# Patient Record
Sex: FEMALE | Race: WHITE | NOT HISPANIC OR LATINO | Employment: FULL TIME | URBAN - METROPOLITAN AREA
[De-identification: names, ages, dates, MRNs, and addresses within clinical notes are randomized per-mention and may not be internally consistent; named-entity substitution may affect disease eponyms.]

---

## 2023-04-24 ENCOUNTER — OFFICE VISIT (OUTPATIENT)
Dept: PRIMARY CARE | Facility: CLINIC | Age: 33
End: 2023-04-24
Payer: COMMERCIAL

## 2023-04-24 VITALS
HEART RATE: 85 BPM | RESPIRATION RATE: 18 BRPM | TEMPERATURE: 98.6 F | WEIGHT: 165 LBS | BODY MASS INDEX: 30.36 KG/M2 | HEIGHT: 62 IN | OXYGEN SATURATION: 100 % | DIASTOLIC BLOOD PRESSURE: 82 MMHG | SYSTOLIC BLOOD PRESSURE: 114 MMHG

## 2023-04-24 DIAGNOSIS — J02.9 SORE THROAT: ICD-10-CM

## 2023-04-24 DIAGNOSIS — J01.90 ACUTE NON-RECURRENT SINUSITIS, UNSPECIFIED LOCATION: Primary | ICD-10-CM

## 2023-04-24 LAB — POC RAPID STREP: NEGATIVE

## 2023-04-24 PROCEDURE — 87880 STREP A ASSAY W/OPTIC: CPT | Performed by: FAMILY MEDICINE

## 2023-04-24 PROCEDURE — 87651 STREP A DNA AMP PROBE: CPT

## 2023-04-24 PROCEDURE — 99213 OFFICE O/P EST LOW 20 MIN: CPT | Performed by: FAMILY MEDICINE

## 2023-04-24 PROCEDURE — 1036F TOBACCO NON-USER: CPT | Performed by: FAMILY MEDICINE

## 2023-04-24 RX ORDER — AMOXICILLIN AND CLAVULANATE POTASSIUM 875; 125 MG/1; MG/1
875 TABLET, FILM COATED ORAL 2 TIMES DAILY
Qty: 20 TABLET | Refills: 0 | Status: SHIPPED | OUTPATIENT
Start: 2023-04-24 | End: 2023-05-04

## 2023-04-24 RX ORDER — CETIRIZINE HYDROCHLORIDE 10 MG/1
TABLET ORAL
COMMUNITY
Start: 2022-06-13

## 2023-04-24 RX ORDER — LISDEXAMFETAMINE DIMESYLATE 30 MG/1
1 CAPSULE ORAL DAILY
COMMUNITY
Start: 2022-10-24 | End: 2023-11-22 | Stop reason: SDUPTHER

## 2023-04-24 RX ORDER — BUSPIRONE HYDROCHLORIDE 5 MG/1
1 TABLET ORAL 2 TIMES DAILY
COMMUNITY
Start: 2023-04-17 | End: 2023-11-22 | Stop reason: ALTCHOICE

## 2023-04-24 RX ORDER — FLUTICASONE PROPIONATE 50 MCG
1 SPRAY, SUSPENSION (ML) NASAL DAILY
Qty: 16 G | Refills: 5 | Status: SHIPPED | OUTPATIENT
Start: 2023-04-24 | End: 2024-04-10 | Stop reason: WASHOUT

## 2023-04-24 ASSESSMENT — ENCOUNTER SYMPTOMS
SINUS COMPLAINT: 1
SORE THROAT: 1

## 2023-04-24 NOTE — PROGRESS NOTES
"Subjective   Patient ID: Lori Fry is a 32 y.o. female who presents for Sinus Problem (Pt. In for sinus issues going on for 3 weeks.) and Sore Throat.    She was starting to feel better she had sent her baby to  and now the whole family has been sick. Her baby has a fever and an ear infection. She started with cold sympotms, was getting better and now having body aches, hot and cold, stuffy nose, headaches, drainage when she blows her nose it is pretty thick.  She is also coughing up phlegm.      Sinus Problem  This is a new problem. The current episode started 1 to 4 weeks ago. Associated symptoms include a sore throat.   Sore Throat          Review of Systems   HENT:  Positive for sore throat.        Objective   /82 (BP Location: Left arm, Patient Position: Sitting)   Pulse 85   Temp 37 °C (98.6 °F) (Temporal)   Resp 18   Ht 1.575 m (5' 2\")   Wt 74.8 kg (165 lb)   SpO2 100%   BMI 30.18 kg/m²     Physical Exam  Constitutional:       Appearance: Normal appearance.   HENT:      Head: Normocephalic.      Right Ear: Tympanic membrane normal.      Left Ear: Tympanic membrane normal.      Nose: Congestion and rhinorrhea present.      Mouth/Throat:      Mouth: Mucous membranes are moist.      Pharynx: Posterior oropharyngeal erythema present.   Eyes:      Pupils: Pupils are equal, round, and reactive to light.   Cardiovascular:      Rate and Rhythm: Normal rate and regular rhythm.      Pulses: Normal pulses.   Pulmonary:      Effort: Pulmonary effort is normal. No respiratory distress.   Musculoskeletal:      Cervical back: Normal range of motion.   Lymphadenopathy:      Cervical: Cervical adenopathy present.   Skin:     General: Skin is warm and dry.   Neurological:      Mental Status: She is alert.         Assessment/Plan   Diagnoses and all orders for this visit:  Acute non-recurrent sinusitis, unspecified location  -     amoxicillin-pot clavulanate (Augmentin) 875-125 mg tablet; Take 1 tablet " (875 mg) by mouth in the morning and 1 tablet (875 mg) before bedtime. Do all this for 10 days.  -     fluticasone (Flonase) 50 mcg/actuation nasal spray; Administer 1 spray into each nostril once daily. Shake gently. Before first use, prime pump. After use, clean tip and replace cap.  Sore throat  -     POCT Rapid Strep A manually resulted  -     Group A Streptococcus, PCR; Future

## 2023-04-24 NOTE — PATIENT INSTRUCTIONS
Today we treated you for a sinus infection.  I sent in an antibiotic. Advise a probiotic daily while on abx. Follow up if no improvement or if symptoms worsen.

## 2023-04-25 LAB — GROUP A STREP, PCR: NOT DETECTED

## 2023-04-28 RX ORDER — METHYLPREDNISOLONE 4 MG/1
TABLET ORAL
Qty: 21 TABLET | Refills: 0 | Status: SHIPPED | OUTPATIENT
Start: 2023-04-28 | End: 2023-05-05

## 2023-05-22 ENCOUNTER — APPOINTMENT (OUTPATIENT)
Dept: PRIMARY CARE | Facility: CLINIC | Age: 33
End: 2023-05-22
Payer: COMMERCIAL

## 2023-05-26 ENCOUNTER — OFFICE VISIT (OUTPATIENT)
Dept: PRIMARY CARE | Facility: CLINIC | Age: 33
End: 2023-05-26
Payer: COMMERCIAL

## 2023-05-26 VITALS
BODY MASS INDEX: 27.8 KG/M2 | DIASTOLIC BLOOD PRESSURE: 64 MMHG | RESPIRATION RATE: 18 BRPM | WEIGHT: 152 LBS | SYSTOLIC BLOOD PRESSURE: 102 MMHG | TEMPERATURE: 98 F | HEART RATE: 84 BPM | OXYGEN SATURATION: 98 %

## 2023-05-26 DIAGNOSIS — J18.9 RECURRENT PNEUMONIA: Primary | ICD-10-CM

## 2023-05-26 DIAGNOSIS — C83.30 DIFFUSE LARGE B-CELL LYMPHOMA, UNSPECIFIED BODY REGION (MULTI): ICD-10-CM

## 2023-05-26 PROCEDURE — 1036F TOBACCO NON-USER: CPT | Performed by: FAMILY MEDICINE

## 2023-05-26 PROCEDURE — 99214 OFFICE O/P EST MOD 30 MIN: CPT | Performed by: FAMILY MEDICINE

## 2023-05-26 PROCEDURE — 3008F BODY MASS INDEX DOCD: CPT | Performed by: FAMILY MEDICINE

## 2023-05-26 RX ORDER — LEVOFLOXACIN 750 MG/1
TABLET ORAL
COMMUNITY
Start: 2023-05-20 | End: 2024-01-03

## 2023-05-26 RX ORDER — PHENOL 1.4 %
AEROSOL, SPRAY (ML) MUCOUS MEMBRANE
COMMUNITY
Start: 2022-06-13 | End: 2024-04-10 | Stop reason: WASHOUT

## 2023-05-26 RX ORDER — METHYLPREDNISOLONE 4 MG/1
TABLET ORAL
Qty: 21 TABLET | Refills: 0 | Status: SHIPPED | OUTPATIENT
Start: 2023-05-26 | End: 2023-06-02

## 2023-05-26 RX ORDER — ALBUTEROL SULFATE 90 UG/1
AEROSOL, METERED RESPIRATORY (INHALATION)
COMMUNITY
Start: 2023-05-20 | End: 2023-05-26 | Stop reason: SDUPTHER

## 2023-05-26 RX ORDER — ALBUTEROL SULFATE 90 UG/1
2 AEROSOL, METERED RESPIRATORY (INHALATION) 4 TIMES DAILY
Qty: 18 G | Refills: 0 | Status: SHIPPED | OUTPATIENT
Start: 2023-05-26 | End: 2024-04-16 | Stop reason: WASHOUT

## 2023-05-26 ASSESSMENT — ENCOUNTER SYMPTOMS: SPUTUM PRODUCTION: 0

## 2023-05-26 NOTE — PROGRESS NOTES
Subjective   Patient ID: Lori Fry is a 32 y.o. female who presents for Pneumonia (Diagnosed in ER about 4 weeks ago. Still experiencing, slight SOB, fatigue ).    She was here 6 weeks ago because she had cold symptoms.  We did Augmentin and then steroids.  She was getting better.  The swelling in the throat went away and she increased her appetite again.  After a few days her cough was developing and she hadn't had a cough before.  That got bad quickly.  She passed out at home she thinks she was coughing so hard that she passed out.  She was dizzy and coughing.  They did an Xray and she got diagnosed with pneumonia.  Then she was put on doxycycline  x 7 days.  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  She thought it was the end of the pneumonia.  Then last Friday she started feeling so sick again - diarrhea, vomiting, lightheaded, dizzy, nauseous.  She went to [facility] this time.  They did another Xray. The pneumonia was there it was on the other side now.  They gave her albuterol and levaquin.  Today is the last day. She has no more fevers.  She is coughing still.  She uses the albuterol 3 times a day.      She has no history of asthma. She isn't a smoker.  She is nervous because she seems to keep getting better and then worse again    She has scar tissue on one side of her lung (left) from when she had non hodgkins lymphoma.    She has a 14 month old but not nursing    Pneumonia  There is no sputum production.        Review of Systems   Respiratory:  Negative for sputum production.        Objective   /64   Pulse 84   Temp 36.7 °C (98 °F)   Resp 18   Wt 68.9 kg (152 lb)   LMP 05/19/2023 (Approximate)   SpO2 98%   BMI 27.80 kg/m²     Physical Exam  Constitutional:       Appearance: Normal appearance.   HENT:      Head: Normocephalic and atraumatic.      Right Ear: Tympanic membrane normal.      Left Ear: Tympanic membrane normal.      Nose: Congestion and rhinorrhea present.       Mouth/Throat:      Mouth: Mucous membranes are moist.   Cardiovascular:      Rate and Rhythm: Normal rate and regular rhythm.      Pulses: Normal pulses.   Pulmonary:      Effort: Pulmonary effort is normal. No respiratory distress.      Breath sounds: Normal breath sounds. No stridor. No wheezing, rhonchi or rales.   Musculoskeletal:      Cervical back: Normal range of motion and neck supple.   Skin:     General: Skin is warm and dry.   Neurological:      Mental Status: She is alert.   Psychiatric:         Mood and Affect: Mood normal.         Assessment/Plan

## 2023-05-26 NOTE — PATIENT INSTRUCTIONS
Today we have addressed your recurrent pneumonia and cough    Due to your complex history I have advised that we follow up with a CT scan next week after you are finished with your abx. You should take a medrol pack as well to help with post infectious cough and use your albuterol every 4 hours while awake.    Follow up when results received.

## 2023-06-02 ENCOUNTER — TELEPHONE (OUTPATIENT)
Dept: PRIMARY CARE | Facility: CLINIC | Age: 33
End: 2023-06-02
Payer: COMMERCIAL

## 2023-06-02 DIAGNOSIS — H10.30 ACUTE CONJUNCTIVITIS, UNSPECIFIED ACUTE CONJUNCTIVITIS TYPE, UNSPECIFIED LATERALITY: Primary | ICD-10-CM

## 2023-06-02 RX ORDER — TOBRAMYCIN 3 MG/ML
1 SOLUTION/ DROPS OPHTHALMIC EVERY 4 HOURS
Qty: 4.2 ML | Refills: 0 | Status: SHIPPED | OUTPATIENT
Start: 2023-06-02 | End: 2023-06-16

## 2023-06-02 NOTE — TELEPHONE ENCOUNTER
Pt called and said she thinks she is getting pink eye and is wondering if she can get a prescription for that.  Please advise

## 2023-06-11 ENCOUNTER — TELEPHONE (OUTPATIENT)
Dept: PRIMARY CARE | Facility: CLINIC | Age: 33
End: 2023-06-11
Payer: COMMERCIAL

## 2023-06-11 DIAGNOSIS — C83.30 DIFFUSE LARGE B-CELL LYMPHOMA, UNSPECIFIED BODY REGION (MULTI): ICD-10-CM

## 2023-06-11 DIAGNOSIS — J18.9 RECURRENT PNEUMONIA: Primary | ICD-10-CM

## 2023-06-11 NOTE — RESULT ENCOUNTER NOTE
Your CT scan  show signs of infectious or inflammatory process, which may take several weeks to resolve along with a couple of lung nodules. I definitely want to repeat a CT scan in three months, but also are you currently following with oncology any longer or have you not followed anyone since you’ve been in remission? I would like to reach out to them if you are and also, I think we should have you see a pulmonologist because of the recurrent infections and the lung nodules that were seen.

## 2023-11-08 ENCOUNTER — PATIENT MESSAGE (OUTPATIENT)
Dept: PULMONOLOGY | Facility: CLINIC | Age: 33
End: 2023-11-08
Payer: COMMERCIAL

## 2023-11-10 NOTE — PATIENT COMMUNICATION
Images have been received. In patients chart.          Image request form has been completed and sent for images to be sent to the patients chart.      Frances Urena, APRN-CNP  You3 hours ago (10:26 AM)       I can see the read but I need the images pushed over and she needs a follow up appt. Thank you

## 2023-11-16 ENCOUNTER — TELEPHONE (OUTPATIENT)
Dept: PULMONOLOGY | Facility: CLINIC | Age: 33
End: 2023-11-16
Payer: COMMERCIAL

## 2023-11-16 NOTE — TELEPHONE ENCOUNTER
Called and spoke with patient.    She will call  and schedule CT Scan , once she gets that scheduled she will call to schedule a follow up with Frances Urena CNP.

## 2023-11-16 NOTE — TELEPHONE ENCOUNTER
Can you call Lori and have her repeat the CT Chest since the last one was in August. And have her follow up with me? Please and thanks

## 2023-11-16 NOTE — TELEPHONE ENCOUNTER
Regarding: New appointment and shared records  Contact: [phone]  ----- Message from Jennifer Mcconnell MA sent at 11/10/2023  1:54 PM EST -----       ----- Message from Lori Fry to DAT Mahoney-CNP sent at 11/8/2023  8:26 AM -----   Hi - you had me set to do a follow up ct scan after our initial appointment. By coincidence I got sick and ended up getting a ct scan through the [facility]. I tried to link these [employer] accounts. Is that something you are able to see and/or review? I wanted to verify if you had access to the follow up ct scan before coming back into discuss if need be or if you felt we didn’t need a follow up appointment. I’m still getting these respirate infections frequently. Thank you.

## 2023-11-21 DIAGNOSIS — F90.9 ADULT ADHD: ICD-10-CM

## 2023-11-21 DIAGNOSIS — F41.9 ANXIETY: ICD-10-CM

## 2023-11-22 PROBLEM — F41.9 ANXIETY: Status: ACTIVE | Noted: 2023-11-22

## 2023-11-22 PROBLEM — F90.9 ADULT ADHD: Status: ACTIVE | Noted: 2023-11-22

## 2023-11-22 RX ORDER — ESCITALOPRAM OXALATE 5 MG/1
5 TABLET ORAL DAILY
COMMUNITY
Start: 2023-08-09 | End: 2023-11-22 | Stop reason: SDUPTHER

## 2023-11-22 RX ORDER — ESCITALOPRAM OXALATE 10 MG/1
10 TABLET ORAL DAILY
Qty: 30 TABLET | Refills: 1 | Status: SHIPPED | OUTPATIENT
Start: 2023-11-22 | End: 2024-01-08 | Stop reason: SDUPTHER

## 2023-11-22 RX ORDER — LISDEXAMFETAMINE DIMESYLATE 30 MG/1
30 CAPSULE ORAL DAILY
Qty: 30 CAPSULE | Refills: 0 | Status: SHIPPED | OUTPATIENT
Start: 2023-12-07 | End: 2024-01-08 | Stop reason: SDUPTHER

## 2023-11-22 NOTE — PROGRESS NOTES
Refill for Lexapro and Vyvanse placed per patient request. Reviewed OARRS on 11/22/23. No discrepancies or concerns noted. Vyvanse 30mg daily last filled on 11/8/23.

## 2023-12-06 ENCOUNTER — ANCILLARY PROCEDURE (OUTPATIENT)
Dept: RADIOLOGY | Facility: CLINIC | Age: 33
End: 2023-12-06
Payer: COMMERCIAL

## 2023-12-06 DIAGNOSIS — J06.9 ACUTE UPPER RESPIRATORY INFECTION, UNSPECIFIED: ICD-10-CM

## 2023-12-06 DIAGNOSIS — R05.9 COUGH, UNSPECIFIED: ICD-10-CM

## 2023-12-06 PROCEDURE — 71250 CT THORAX DX C-: CPT | Performed by: RADIOLOGY

## 2023-12-06 PROCEDURE — 71250 CT THORAX DX C-: CPT

## 2023-12-08 ENCOUNTER — TELEMEDICINE (OUTPATIENT)
Dept: PULMONOLOGY | Facility: CLINIC | Age: 33
End: 2023-12-08
Payer: COMMERCIAL

## 2023-12-08 DIAGNOSIS — J98.8 RECURRENT RESPIRATORY INFECTION: Primary | ICD-10-CM

## 2023-12-08 DIAGNOSIS — R91.8 LUNG NODULES: ICD-10-CM

## 2023-12-08 PROCEDURE — 99214 OFFICE O/P EST MOD 30 MIN: CPT

## 2023-12-08 RX ORDER — LEVOFLOXACIN 500 MG/1
500 TABLET, FILM COATED ORAL DAILY
Qty: 7 TABLET | Refills: 0 | Status: SHIPPED | OUTPATIENT
Start: 2023-12-08 | End: 2023-12-15

## 2023-12-08 NOTE — H&P (VIEW-ONLY)
l Patient: Lori Fry    [id number]  : 1990 -- AGE 33 y.o.    Provider: Frances DAUGHERTY- [facility]     Location [facility]   Service Date: 2023         Department of Medicine  Division of Pulmonary, Critical Care, and Sleep Medicine         [facility]  Follow Up Visit Note      Virtual or Telephone Consent  An interactive audio and video telecommunication system which permits real time communications between the patient (at the originating site) and provider (at the distant site) was utilized to provide this telehealth service.   Verbal consent was requested and obtained from Lori Fry on this date, 23 for a telehealth visit.       HISTORY OF PRESENT ILLNESS     HISTORY OF PRESENT ILLNESS   Lori Fry is a 33 y.o. female with a history of Large B Cell lymphoma who is a never smoker, who presents to a [facility] for a follow up evaluation for chronic cough and recurrent respiratory infections.     DATE OF LAST VISIT: 6/15/23    Current History    On today's visit, the patient reports keep she continues to have a productive cough with neon green to dark brown sputum.  Did not obtain sputum samples as discussed at last visit.  Since last visit she had pneumonia in August, was treated with antibiotics. She denies fevers, chills, chest pain and shortness of breath.      6/15/23: Mrs. Fry has a history of B cell lymphoma in . She completed chemotherapy in 2017. She continues to follow with oncology once a year. She reports going to the ED 23 for cough and SOB, she was given duo nebs and IVF for tachycardia. She reports having pneumonia over a month ago, she was given 3 courses of antibiotics and steroids until she finally improved. She reports feeling like she is starting to get another URI. She reports being sick on and off, thinks its from having a 14 month child in day care. She coughs up neon  green to brownish mucus, reports bloody noses and thinks that is why her mucus is sometimes brownish color. She was given an albuterol inhaler when she had pneumonia, she was using it frequently but now she is only needing to use it every other day. Denies wheezing, MOYA, chest pain, fevers, chills. She has allergy symptoms and takes cetirizine and Flonase.     Previous pulmonary history: She had pneumonia in May 2023. Pneumonia 5 years ago.      Inhalers/nebulized medications: albuterol     Hospitalization History: She has not been hospitalized over the last year for breathing related problem.     Sleep history: Denies snoring, apnea, feeling tired during the day or taking naps during the day.        REVIEW OF SYSTEMS     REVIEW OF SYSTEMS  Review of Systems    Constitutional: No fever, no chills, no night sweats.    Eyes: No double vision, no floaters, no dry eyes.   ENT: See HPI.   Neck: No neck stiffness.  Cardiovascular: No sharp chest pain, no heart racing, no leg swelling.  Respiratory: as noted in HPI. .   Integumentary: No rashes or sores.  Neurological: No dizziness, no headaches. Sleeping well.  Psychiatric: No mood changes.       ALLERGIES AND MEDICATIONS     ALLERGIES  No Known Allergies    MEDICATIONS  Current Outpatient Medications   Medication Sig Dispense Refill    albuterol 90 mcg/actuation inhaler Inhale 2 puffs 4 times a day. 18 g 0    cetirizine (ZyrTEC) 10 mg tablet Take by mouth.      escitalopram (Lexapro) 10 mg tablet Take 1 tablet (10 mg) by mouth once daily. 30 tablet 1    fluticasone (Flonase) 50 mcg/actuation nasal spray Administer 1 spray into each nostril once daily. Shake gently. Before first use, prime pump. After use, clean tip and replace cap. 16 g 5    levoFLOXacin (Levaquin) 750 mg tablet TAKE 1 TABLET BY MOUTH EVERY DAY FOR 7 DAYS      multivitamin-min-iron-FA-vit K (Adults Multivitamin) 18 mg iron-400 mcg-25 mcg tablet Take by mouth.      Vyvanse 30 mg capsule Take 1 capsule (30  mg) by mouth once daily. Do not start before December 7, 2023. 30 capsule 0     No current facility-administered medications for this visit.         PAST HISTORY     PAST MEDICAL HISTORY  Large B Cell lymphoma      PAST SURGICAL HISTORY  Past Surgical History:   Procedure Laterality Date    LYMPH NODE BIOPSY      chest - diagnosed with BCell NHL    WISDOM TOOTH EXTRACTION         IMMUNIZATION HISTORY  Immunization History   Administered Date(s) Administered    Pfizer Purple Cap SARS-CoV-2 03/11/2021, 04/01/2021, 12/16/2021       SOCIAL HISTORY  Tobacco Smoking: never  Illicit drugs: none  Alcohol consumption: none  Pets: 2 dogs    OCCUPATIONAL/ENVIRONMENTAL HISTORY  Occupation: Currently works as .  No have known exposure to asbestos, silica, beryllium or inhaled metals.    FAMILY HISTORY  No have a family history of pulmonary disease.  No have a family history of cancer.      PHYSICAL EXAM     VITAL SIGNS: There were no vitals taken for this visit.     PREVIOUS WEIGHTS:  Wt Readings from Last 3 Encounters:   06/15/23 72.1 kg (159 lb)   05/26/23 68.9 kg (152 lb)   04/24/23 74.8 kg (165 lb)       Physical Exam    Virtual video visit    RESULTS/DATA     Pulmonary Function Test Results     12/2017: FEV1/FVC 0.90/ FEV1 2.24 (70%)/ FVC 2.50 (66%)     Chest Radiograph     XR chest 2 view 05/20/2023    Narrative  Interpreted By:  DAVID ORO MD  STUDY:  Chest Radiographs; 05/20/2023 10:05 AM    INDICATION:  Cough.  Recent pneumonia.    COMPARISON:  XR chest 05/06/2023    ACCESSION NUMBER(S):  [id number]    ORDERING CLINICIAN:  ROD MORIN DO    TECHNIQUE:  Frontal and lateral chest.    FINDINGS:    CARDIOMEDIASTINAL SILHOUETTE:  Cardiomediastinal silhouette is normal in size and configuration.    LUNGS:  Small patchy area of airspace consolidation in the right middle lobe.  Underlying emphysema.  Stable chronic changes in the left midlung zone    ABDOMEN:  No remarkable upper abdominal  findings.    BONES:  No acute osseous changes.    Impression  Small patchy area of airspace consolidation in the right middle lobe.  Correlate clinically for pneumonia.      Signed by Isra Zuluaga MD      Chest CT Scan     Narrative & Impression   Interpreted By:  Candido Rojas,   STUDY:  CT CHEST WO IV CONTRAST;  12/6/2023 9:44 am      INDICATION:  Signs/Symptoms: recurrent URI/ cough  R05.9: Cough J06.9: Recurrent  URI (upper respiratory infection). History of lymphoma.      COMPARISON:  06/09/2023 enhanced thoracic CT.      ACCESSION NUMBER(S):  [id number]      ORDERING CLINICIAN:  DELFINO MALDONADO      TECHNIQUE:  Helical data acquisition of the chest was obtained  without IV  contrast material.  Images were reformatted in axial, coronal, and  sagittal planes.      FINDINGS:  LUNGS AND AIRWAYS:  The trachea and central airways are patent. Lingular and minimal left  upper lobe and medial right middle lobe bronchiectasis and lingular  bronchial wall thickening are noted. Portions of the middle lobe  medial segment bronchi are opacified.      Many round and irregular posteroinferior right upper lobe nodules  that measure up to at least 7 mm have increased in number. A 12 x 6  mm irregular nodule, a few tiny nodules and mild scarring in the left  upper lobe seem stable. Greater probably fibrotic changes in the  lingula are similar to the prior CT. Mild bilateral apical scarring.  Stable peripheral anterolateral right upper lobe micronodule (image  86 series 205). Mild pulmonary hyperinflation without bullae or  blebs. The above findings could reflect MAC complex infection, but  are not specific and should be correlated clinically. No pleural  effusion. Inferolateral right middle lobe and minimal basal left  lower lobe ground-glass changes reported on the prior CT have  resolved.      MEDIASTINUM AND IRMA, LOWER NECK AND AXILLA:  Normal size homogeneous thyroid.      An 18 x 8 x 17 mm right paratracheal lymph  "node just above the aortic  arch level and other mediastinal nodes are increased but still within  normal limits in size. Mild subcarinal adenopathy measures up to 13  mm in short axis AP diameter.      Esophagus appears within normal limits as seen.      HEART AND VESSELS:  The thoracic aorta is normal in course and caliber without vascular  calcifications.      Main pulmonary artery and its branches are normal in caliber.      No coronary artery calcifications are seen. The study is not  optimized for evaluation of coronary arteries.      The cardiac chambers are not enlarged.      No pericardial effusion.      UPPER ABDOMEN:  The visualized subdiaphragmatic structures demonstrate no remarkable  findings.      CHEST WALL AND OSSEOUS STRUCTURES:  There are no suspicious osseous lesions. Sternomanubrial joint  ankylosis.      IMPRESSION:  1. Chronic bilateral pulmonary findings described above might reflect  MAC complex infection, but are not specific.  2. A small right middle lobe infiltrate and minimal left lower lobe  ground-glass changes present in June 2023 have resolved.  Subcentimeter posteroinferior right upper lobe nodular opacities have  increased.  3. Minimal mediastinal adenopathy.      MACRO:  None      Signed by: Candido Rojas 12/7/2023 10:42 AM  Dictation workstation:   [id number]        Echocardiogram     No results found for this or any previous visit from the past 365 days.       Labwork   Complete Blood Count  Lab Results   Component Value Date    WBC 19.6 (H) 05/06/2023    HGB 13.5 05/06/2023    HCT 39.4 05/06/2023    MCV 84 05/06/2023     05/06/2023       Peripheral Eosinophil Count/Percentage:   Eosinophils Absolute (x10E9/L)   Date Value   05/06/2023 0.01     Eosinophils % (%)   Date Value   05/06/2023 0.1       Serum Immunoglobulin E:    No results found for: \"IGE\"       ASSESSMENT/PLAN     Ms. Fry is a 33 y.o. female, who has a history of Large B Cell lymphoma who is a never " smoker, who presents to a [facility] for a follow up evaluation for chronic cough and recurrent respiratory infections.   Discussed with Dr. Sanket Ag. Wait for sputum sampes to result. May be need to perform a bronchoscopy.    Problem List and Orders  Diagnoses and all orders for this visit:  Recurrent respiratory infection  -     levoFLOXacin (Levaquin) 500 mg tablet; Take 1 tablet (500 mg) by mouth once daily for 7 days. Take 1 pill daily with food.  -     AFB Culture/Smear  -     Fungal Culture/Smear  -     Respiratory Culture/Smear    Assessment and Plan / Recommendations:  Problem List Items Addressed This Visit    None   Productive Cough/ Recurrent URI  - will get sputum samples  - continue albuterol HFA inhaler or albuterol nebulizer every 4-6 hours as needed  - START course of levofloxacin 500mg for 7 days    Lung Nodules/Respiratory Infection      - will wait for sputum samples to result before deciding to order a bronchoscopy        Will call patient with sputum results    If you have any questions please call the office [phone]    Thank you for visiting the Pulmonary clinic today!   Frances Urena CNP  [phone]

## 2023-12-08 NOTE — PROGRESS NOTES
l Patient: Lori Fry    [id number]  : 1990 -- AGE 33 y.o.    Provider: Frances DAUGHERTY- [facility]     Location [facility]   Service Date: 2023         Department of Medicine  Division of Pulmonary, Critical Care, and Sleep Medicine         [facility]  Follow Up Visit Note      Virtual or Telephone Consent  An interactive audio and video telecommunication system which permits real time communications between the patient (at the originating site) and provider (at the distant site) was utilized to provide this telehealth service.   Verbal consent was requested and obtained from Lori Fry on this date, 23 for a telehealth visit.       HISTORY OF PRESENT ILLNESS     HISTORY OF PRESENT ILLNESS   Lori Fry is a 33 y.o. female with a history of Large B Cell lymphoma who is a never smoker, who presents to a [facility] for a follow up evaluation for chronic cough and recurrent respiratory infections.     DATE OF LAST VISIT: 6/15/23    Current History    On today's visit, the patient reports keep she continues to have a productive cough with neon green to dark brown sputum.  Did not obtain sputum samples as discussed at last visit.  Since last visit she had pneumonia in August, was treated with antibiotics. She denies fevers, chills, chest pain and shortness of breath.      6/15/23: Mrs. Fry has a history of B cell lymphoma in . She completed chemotherapy in 2017. She continues to follow with oncology once a year. She reports going to the ED 23 for cough and SOB, she was given duo nebs and IVF for tachycardia. She reports having pneumonia over a month ago, she was given 3 courses of antibiotics and steroids until she finally improved. She reports feeling like she is starting to get another URI. She reports being sick on and off, thinks its from having a 14 month child in day care. She coughs up neon  green to brownish mucus, reports bloody noses and thinks that is why her mucus is sometimes brownish color. She was given an albuterol inhaler when she had pneumonia, she was using it frequently but now she is only needing to use it every other day. Denies wheezing, MOYA, chest pain, fevers, chills. She has allergy symptoms and takes cetirizine and Flonase.     Previous pulmonary history: She had pneumonia in May 2023. Pneumonia 5 years ago.      Inhalers/nebulized medications: albuterol     Hospitalization History: She has not been hospitalized over the last year for breathing related problem.     Sleep history: Denies snoring, apnea, feeling tired during the day or taking naps during the day.        REVIEW OF SYSTEMS     REVIEW OF SYSTEMS  Review of Systems    Constitutional: No fever, no chills, no night sweats.    Eyes: No double vision, no floaters, no dry eyes.   ENT: See HPI.   Neck: No neck stiffness.  Cardiovascular: No sharp chest pain, no heart racing, no leg swelling.  Respiratory: as noted in HPI. .   Integumentary: No rashes or sores.  Neurological: No dizziness, no headaches. Sleeping well.  Psychiatric: No mood changes.       ALLERGIES AND MEDICATIONS     ALLERGIES  No Known Allergies    MEDICATIONS  Current Outpatient Medications   Medication Sig Dispense Refill    albuterol 90 mcg/actuation inhaler Inhale 2 puffs 4 times a day. 18 g 0    cetirizine (ZyrTEC) 10 mg tablet Take by mouth.      escitalopram (Lexapro) 10 mg tablet Take 1 tablet (10 mg) by mouth once daily. 30 tablet 1    fluticasone (Flonase) 50 mcg/actuation nasal spray Administer 1 spray into each nostril once daily. Shake gently. Before first use, prime pump. After use, clean tip and replace cap. 16 g 5    levoFLOXacin (Levaquin) 750 mg tablet TAKE 1 TABLET BY MOUTH EVERY DAY FOR 7 DAYS      multivitamin-min-iron-FA-vit K (Adults Multivitamin) 18 mg iron-400 mcg-25 mcg tablet Take by mouth.      Vyvanse 30 mg capsule Take 1 capsule (30  mg) by mouth once daily. Do not start before December 7, 2023. 30 capsule 0     No current facility-administered medications for this visit.         PAST HISTORY     PAST MEDICAL HISTORY  Large B Cell lymphoma      PAST SURGICAL HISTORY  Past Surgical History:   Procedure Laterality Date    LYMPH NODE BIOPSY      chest - diagnosed with BCell NHL    WISDOM TOOTH EXTRACTION         IMMUNIZATION HISTORY  Immunization History   Administered Date(s) Administered    Pfizer Purple Cap SARS-CoV-2 03/11/2021, 04/01/2021, 12/16/2021       SOCIAL HISTORY  Tobacco Smoking: never  Illicit drugs: none  Alcohol consumption: none  Pets: 2 dogs    OCCUPATIONAL/ENVIRONMENTAL HISTORY  Occupation: Currently works as .  No have known exposure to asbestos, silica, beryllium or inhaled metals.    FAMILY HISTORY  No have a family history of pulmonary disease.  No have a family history of cancer.      PHYSICAL EXAM     VITAL SIGNS: There were no vitals taken for this visit.     PREVIOUS WEIGHTS:  Wt Readings from Last 3 Encounters:   06/15/23 72.1 kg (159 lb)   05/26/23 68.9 kg (152 lb)   04/24/23 74.8 kg (165 lb)       Physical Exam    Virtual video visit    RESULTS/DATA     Pulmonary Function Test Results     12/2017: FEV1/FVC 0.90/ FEV1 2.24 (70%)/ FVC 2.50 (66%)     Chest Radiograph     XR chest 2 view 05/20/2023    Narrative  Interpreted By:  DAVID ORO MD  STUDY:  Chest Radiographs; 05/20/2023 10:05 AM    INDICATION:  Cough.  Recent pneumonia.    COMPARISON:  XR chest 05/06/2023    ACCESSION NUMBER(S):  [id number]    ORDERING CLINICIAN:  ROD MORIN DO    TECHNIQUE:  Frontal and lateral chest.    FINDINGS:    CARDIOMEDIASTINAL SILHOUETTE:  Cardiomediastinal silhouette is normal in size and configuration.    LUNGS:  Small patchy area of airspace consolidation in the right middle lobe.  Underlying emphysema.  Stable chronic changes in the left midlung zone    ABDOMEN:  No remarkable upper abdominal  findings.    BONES:  No acute osseous changes.    Impression  Small patchy area of airspace consolidation in the right middle lobe.  Correlate clinically for pneumonia.      Signed by Isra Zuluaga MD      Chest CT Scan     Narrative & Impression   Interpreted By:  Candido Rojas,   STUDY:  CT CHEST WO IV CONTRAST;  12/6/2023 9:44 am      INDICATION:  Signs/Symptoms: recurrent URI/ cough  R05.9: Cough J06.9: Recurrent  URI (upper respiratory infection). History of lymphoma.      COMPARISON:  06/09/2023 enhanced thoracic CT.      ACCESSION NUMBER(S):  [id number]      ORDERING CLINICIAN:  DELFINO MALDONADO      TECHNIQUE:  Helical data acquisition of the chest was obtained  without IV  contrast material.  Images were reformatted in axial, coronal, and  sagittal planes.      FINDINGS:  LUNGS AND AIRWAYS:  The trachea and central airways are patent. Lingular and minimal left  upper lobe and medial right middle lobe bronchiectasis and lingular  bronchial wall thickening are noted. Portions of the middle lobe  medial segment bronchi are opacified.      Many round and irregular posteroinferior right upper lobe nodules  that measure up to at least 7 mm have increased in number. A 12 x 6  mm irregular nodule, a few tiny nodules and mild scarring in the left  upper lobe seem stable. Greater probably fibrotic changes in the  lingula are similar to the prior CT. Mild bilateral apical scarring.  Stable peripheral anterolateral right upper lobe micronodule (image  86 series 205). Mild pulmonary hyperinflation without bullae or  blebs. The above findings could reflect MAC complex infection, but  are not specific and should be correlated clinically. No pleural  effusion. Inferolateral right middle lobe and minimal basal left  lower lobe ground-glass changes reported on the prior CT have  resolved.      MEDIASTINUM AND IRMA, LOWER NECK AND AXILLA:  Normal size homogeneous thyroid.      An 18 x 8 x 17 mm right paratracheal lymph  "node just above the aortic  arch level and other mediastinal nodes are increased but still within  normal limits in size. Mild subcarinal adenopathy measures up to 13  mm in short axis AP diameter.      Esophagus appears within normal limits as seen.      HEART AND VESSELS:  The thoracic aorta is normal in course and caliber without vascular  calcifications.      Main pulmonary artery and its branches are normal in caliber.      No coronary artery calcifications are seen. The study is not  optimized for evaluation of coronary arteries.      The cardiac chambers are not enlarged.      No pericardial effusion.      UPPER ABDOMEN:  The visualized subdiaphragmatic structures demonstrate no remarkable  findings.      CHEST WALL AND OSSEOUS STRUCTURES:  There are no suspicious osseous lesions. Sternomanubrial joint  ankylosis.      IMPRESSION:  1. Chronic bilateral pulmonary findings described above might reflect  MAC complex infection, but are not specific.  2. A small right middle lobe infiltrate and minimal left lower lobe  ground-glass changes present in June 2023 have resolved.  Subcentimeter posteroinferior right upper lobe nodular opacities have  increased.  3. Minimal mediastinal adenopathy.      MACRO:  None      Signed by: Candido Rojas 12/7/2023 10:42 AM  Dictation workstation:   [id number]        Echocardiogram     No results found for this or any previous visit from the past 365 days.       Labwork   Complete Blood Count  Lab Results   Component Value Date    WBC 19.6 (H) 05/06/2023    HGB 13.5 05/06/2023    HCT 39.4 05/06/2023    MCV 84 05/06/2023     05/06/2023       Peripheral Eosinophil Count/Percentage:   Eosinophils Absolute (x10E9/L)   Date Value   05/06/2023 0.01     Eosinophils % (%)   Date Value   05/06/2023 0.1       Serum Immunoglobulin E:    No results found for: \"IGE\"       ASSESSMENT/PLAN     Ms. Fry is a 33 y.o. female, who has a history of Large B Cell lymphoma who is a never " smoker, who presents to a [facility] for a follow up evaluation for chronic cough and recurrent respiratory infections.   Discussed with Dr. Sanket Ag. Wait for sputum sampes to result. May be need to perform a bronchoscopy.    Problem List and Orders  Diagnoses and all orders for this visit:  Recurrent respiratory infection  -     levoFLOXacin (Levaquin) 500 mg tablet; Take 1 tablet (500 mg) by mouth once daily for 7 days. Take 1 pill daily with food.  -     AFB Culture/Smear  -     Fungal Culture/Smear  -     Respiratory Culture/Smear    Assessment and Plan / Recommendations:  Problem List Items Addressed This Visit    None   Productive Cough/ Recurrent URI  - will get sputum samples  - continue albuterol HFA inhaler or albuterol nebulizer every 4-6 hours as needed  - START course of levofloxacin 500mg for 7 days    Lung Nodules/Respiratory Infection      - will wait for sputum samples to result before deciding to order a bronchoscopy        Will call patient with sputum results    If you have any questions please call the office [phone]    Thank you for visiting the Pulmonary clinic today!   Frances Urena CNP  [phone]

## 2023-12-11 ENCOUNTER — TELEPHONE (OUTPATIENT)
Dept: PULMONOLOGY | Facility: CLINIC | Age: 33
End: 2023-12-11
Payer: COMMERCIAL

## 2023-12-15 ENCOUNTER — LAB (OUTPATIENT)
Dept: LAB | Facility: LAB | Age: 33
End: 2023-12-15
Payer: COMMERCIAL

## 2023-12-15 PROCEDURE — 87102 FUNGUS ISOLATION CULTURE: CPT

## 2023-12-15 PROCEDURE — 87070 CULTURE OTHR SPECIMN AEROBIC: CPT

## 2023-12-15 PROCEDURE — 87205 SMEAR GRAM STAIN: CPT

## 2023-12-15 PROCEDURE — 87206 SMEAR FLUORESCENT/ACID STAI: CPT

## 2023-12-15 PROCEDURE — 87116 MYCOBACTERIA CULTURE: CPT

## 2023-12-15 PROCEDURE — 87015 SPECIMEN INFECT AGNT CONCNTJ: CPT

## 2023-12-15 PROCEDURE — 87075 CULTR BACTERIA EXCEPT BLOOD: CPT

## 2023-12-17 LAB
BACTERIA SPEC RESP CULT: NORMAL
GRAM STN SPEC: NORMAL
GRAM STN SPEC: NORMAL

## 2023-12-19 ENCOUNTER — DOCUMENTATION (OUTPATIENT)
Dept: PULMONOLOGY | Facility: CLINIC | Age: 33
End: 2023-12-19
Payer: COMMERCIAL

## 2023-12-19 ENCOUNTER — TELEPHONE (OUTPATIENT)
Dept: PULMONOLOGY | Facility: CLINIC | Age: 33
End: 2023-12-19
Payer: COMMERCIAL

## 2023-12-19 DIAGNOSIS — J98.8 RECURRENT RESPIRATORY INFECTION: Primary | ICD-10-CM

## 2023-12-19 NOTE — PROGRESS NOTES
I spoke with  Ang about the discussion between myself and Dr. Sanket Ag. He would like to perform a bronchoscopy. She is in agreement with this plan. Will get this scheduled for 1/3/24.

## 2023-12-19 NOTE — TELEPHONE ENCOUNTER
----- Message from DAT Mahoney-CNP sent at 12/19/2023  2:39 PM EST -----  Regarding: Bronch  Kathryn, Can you please call Mrs. Fry and get a bronch scheduled for her for 1/3/24. Thank you

## 2024-01-02 LAB
FUNGUS SPEC CULT: NORMAL
FUNGUS SPEC FUNGUS STN: NORMAL

## 2024-01-03 ENCOUNTER — ANESTHESIA EVENT (OUTPATIENT)
Dept: GASTROENTEROLOGY | Facility: HOSPITAL | Age: 34
End: 2024-01-03
Payer: COMMERCIAL

## 2024-01-03 ENCOUNTER — HOSPITAL ENCOUNTER (OUTPATIENT)
Dept: GASTROENTEROLOGY | Facility: HOSPITAL | Age: 34
Setting detail: OUTPATIENT SURGERY
Discharge: HOME | End: 2024-01-03
Payer: COMMERCIAL

## 2024-01-03 ENCOUNTER — ANESTHESIA (OUTPATIENT)
Dept: GASTROENTEROLOGY | Facility: HOSPITAL | Age: 34
End: 2024-01-03
Payer: COMMERCIAL

## 2024-01-03 VITALS
DIASTOLIC BLOOD PRESSURE: 64 MMHG | RESPIRATION RATE: 20 BRPM | WEIGHT: 160 LBS | SYSTOLIC BLOOD PRESSURE: 104 MMHG | HEART RATE: 80 BPM | BODY MASS INDEX: 29.44 KG/M2 | OXYGEN SATURATION: 98 % | HEIGHT: 62 IN | TEMPERATURE: 97.3 F

## 2024-01-03 DIAGNOSIS — J98.8 RECURRENT RESPIRATORY INFECTION: ICD-10-CM

## 2024-01-03 DIAGNOSIS — J47.0 BRONCHIECTASIS WITH ACUTE LOWER RESPIRATORY INFECTION (MULTI): Primary | ICD-10-CM

## 2024-01-03 LAB
BASOPHILS NFR FLD MANUAL: 0 %
BLASTS NFR FLD MANUAL: 0 %
CLARITY FLD: ABNORMAL
COLOR FLD: COLORLESS
EOSINOPHIL NFR FLD MANUAL: 6 %
HCG UR QL IA.RAPID: NEGATIVE
IMMATURE GRANULOCYTES IN FLUID: 0 %
LYMPHOCYTES NFR FLD MANUAL: 6 %
MONOS+MACROS NFR FLD MANUAL: 9 %
NEUTROPHILS NFR FLD MANUAL: 79 %
OTHER CELLS NFR FLD MANUAL: 0 %
PLASMA CELLS NFR FLD MANUAL: 0 %
RBC # FLD AUTO: 3000 /UL
TOTAL CELLS COUNTED FLD: 100
WBC # FLD AUTO: 6107 /UL

## 2024-01-03 PROCEDURE — 2500000004 HC RX 250 GENERAL PHARMACY W/ HCPCS (ALT 636 FOR OP/ED): Performed by: ANESTHESIOLOGIST ASSISTANT

## 2024-01-03 PROCEDURE — 89051 BODY FLUID CELL COUNT: CPT | Performed by: INTERNAL MEDICINE

## 2024-01-03 PROCEDURE — 88112 CYTOPATH CELL ENHANCE TECH: CPT | Performed by: PATHOLOGY

## 2024-01-03 PROCEDURE — 88312 SPECIAL STAINS GROUP 1: CPT | Mod: TC,91 | Performed by: INTERNAL MEDICINE

## 2024-01-03 PROCEDURE — 87305 ASPERGILLUS AG IA: CPT | Performed by: INTERNAL MEDICINE

## 2024-01-03 PROCEDURE — 87015 SPECIMEN INFECT AGNT CONCNTJ: CPT | Mod: STJLAB | Performed by: INTERNAL MEDICINE

## 2024-01-03 PROCEDURE — 87581 M.PNEUMON DNA AMP PROBE: CPT | Performed by: INTERNAL MEDICINE

## 2024-01-03 PROCEDURE — 7100000001 HC RECOVERY ROOM TIME - INITIAL BASE CHARGE

## 2024-01-03 PROCEDURE — 3700000002 HC GENERAL ANESTHESIA TIME - EACH INCREMENTAL 1 MINUTE

## 2024-01-03 PROCEDURE — 31624 DX BRONCHOSCOPE/LAVAGE: CPT | Performed by: INTERNAL MEDICINE

## 2024-01-03 PROCEDURE — 2500000005 HC RX 250 GENERAL PHARMACY W/O HCPCS: Performed by: ANESTHESIOLOGIST ASSISTANT

## 2024-01-03 PROCEDURE — A31624 PR BRONCHOSCOPY,DIAGNOSTIC W LAVAGE: Performed by: ANESTHESIOLOGY

## 2024-01-03 PROCEDURE — 7100000010 HC PHASE TWO TIME - EACH INCREMENTAL 1 MINUTE

## 2024-01-03 PROCEDURE — 81025 URINE PREGNANCY TEST: CPT | Performed by: INTERNAL MEDICINE

## 2024-01-03 PROCEDURE — 7100000009 HC PHASE TWO TIME - INITIAL BASE CHARGE

## 2024-01-03 PROCEDURE — 87799 DETECT AGENT NOS DNA QUANT: CPT | Performed by: INTERNAL MEDICINE

## 2024-01-03 PROCEDURE — 88312 SPECIAL STAINS GROUP 1: CPT | Performed by: PATHOLOGY

## 2024-01-03 PROCEDURE — 87799 DETECT AGENT NOS DNA QUANT: CPT | Mod: 91 | Performed by: INTERNAL MEDICINE

## 2024-01-03 PROCEDURE — 87632 RESP VIRUS 6-11 TARGETS: CPT | Performed by: INTERNAL MEDICINE

## 2024-01-03 PROCEDURE — 87070 CULTURE OTHR SPECIMN AEROBIC: CPT | Mod: 91 | Performed by: INTERNAL MEDICINE

## 2024-01-03 PROCEDURE — 87801 DETECT AGNT MULT DNA AMPLI: CPT | Performed by: INTERNAL MEDICINE

## 2024-01-03 PROCEDURE — A31624 PR BRONCHOSCOPY,DIAGNOSTIC W LAVAGE: Performed by: ANESTHESIOLOGIST ASSISTANT

## 2024-01-03 PROCEDURE — 7100000002 HC RECOVERY ROOM TIME - EACH INCREMENTAL 1 MINUTE

## 2024-01-03 PROCEDURE — 88104 CYTOPATH FL NONGYN SMEARS: CPT | Performed by: INTERNAL MEDICINE

## 2024-01-03 PROCEDURE — 3700000001 HC GENERAL ANESTHESIA TIME - INITIAL BASE CHARGE

## 2024-01-03 PROCEDURE — 87102 FUNGUS ISOLATION CULTURE: CPT | Mod: STJLAB | Performed by: INTERNAL MEDICINE

## 2024-01-03 PROCEDURE — 87533 HHV-6 DNA QUANT: CPT | Performed by: INTERNAL MEDICINE

## 2024-01-03 RX ORDER — ACETAMINOPHEN 325 MG/1
975 TABLET ORAL ONCE
Status: DISCONTINUED | OUTPATIENT
Start: 2024-01-03 | End: 2024-01-04 | Stop reason: HOSPADM

## 2024-01-03 RX ORDER — MIDAZOLAM HYDROCHLORIDE 1 MG/ML
INJECTION, SOLUTION INTRAMUSCULAR; INTRAVENOUS AS NEEDED
Status: DISCONTINUED | OUTPATIENT
Start: 2024-01-03 | End: 2024-01-03

## 2024-01-03 RX ORDER — HYDROCODONE BITARTRATE AND ACETAMINOPHEN 5; 325 MG/1; MG/1
1 TABLET ORAL EVERY 4 HOURS PRN
Status: DISCONTINUED | OUTPATIENT
Start: 2024-01-03 | End: 2024-01-04 | Stop reason: HOSPADM

## 2024-01-03 RX ORDER — SUCCINYLCHOLINE CHLORIDE 100 MG/5ML
SYRINGE (ML) INTRAVENOUS AS NEEDED
Status: DISCONTINUED | OUTPATIENT
Start: 2024-01-03 | End: 2024-01-03

## 2024-01-03 RX ORDER — SODIUM CHLORIDE, SODIUM LACTATE, POTASSIUM CHLORIDE, CALCIUM CHLORIDE 600; 310; 30; 20 MG/100ML; MG/100ML; MG/100ML; MG/100ML
100 INJECTION, SOLUTION INTRAVENOUS CONTINUOUS
Status: DISCONTINUED | OUTPATIENT
Start: 2024-01-03 | End: 2024-01-04 | Stop reason: HOSPADM

## 2024-01-03 RX ORDER — MIDAZOLAM HYDROCHLORIDE 1 MG/ML
1 INJECTION, SOLUTION INTRAMUSCULAR; INTRAVENOUS ONCE AS NEEDED
Status: DISCONTINUED | OUTPATIENT
Start: 2024-01-03 | End: 2024-01-04 | Stop reason: HOSPADM

## 2024-01-03 RX ORDER — SODIUM CHLORIDE, SODIUM LACTATE, POTASSIUM CHLORIDE, CALCIUM CHLORIDE 600; 310; 30; 20 MG/100ML; MG/100ML; MG/100ML; MG/100ML
INJECTION, SOLUTION INTRAVENOUS CONTINUOUS PRN
Status: DISCONTINUED | OUTPATIENT
Start: 2024-01-03 | End: 2024-01-03

## 2024-01-03 RX ORDER — LIDOCAINE HYDROCHLORIDE 20 MG/ML
INJECTION, SOLUTION INFILTRATION; PERINEURAL AS NEEDED
Status: DISCONTINUED | OUTPATIENT
Start: 2024-01-03 | End: 2024-01-03

## 2024-01-03 RX ORDER — OXYCODONE HYDROCHLORIDE 10 MG/1
10 TABLET ORAL EVERY 4 HOURS PRN
Status: DISCONTINUED | OUTPATIENT
Start: 2024-01-03 | End: 2024-01-04 | Stop reason: HOSPADM

## 2024-01-03 RX ORDER — ONDANSETRON HYDROCHLORIDE 2 MG/ML
4 INJECTION, SOLUTION INTRAVENOUS ONCE AS NEEDED
Status: DISCONTINUED | OUTPATIENT
Start: 2024-01-03 | End: 2024-01-04 | Stop reason: HOSPADM

## 2024-01-03 RX ORDER — HYDRALAZINE HYDROCHLORIDE 20 MG/ML
5 INJECTION INTRAMUSCULAR; INTRAVENOUS EVERY 30 MIN PRN
Status: DISCONTINUED | OUTPATIENT
Start: 2024-01-03 | End: 2024-01-04 | Stop reason: HOSPADM

## 2024-01-03 RX ORDER — FENTANYL CITRATE 50 UG/ML
INJECTION, SOLUTION INTRAMUSCULAR; INTRAVENOUS AS NEEDED
Status: DISCONTINUED | OUTPATIENT
Start: 2024-01-03 | End: 2024-01-03

## 2024-01-03 RX ORDER — ACETAMINOPHEN 325 MG/1
650 TABLET ORAL EVERY 4 HOURS PRN
Status: DISCONTINUED | OUTPATIENT
Start: 2024-01-03 | End: 2024-01-04 | Stop reason: HOSPADM

## 2024-01-03 RX ORDER — ALBUTEROL SULFATE 0.83 MG/ML
2.5 SOLUTION RESPIRATORY (INHALATION) ONCE AS NEEDED
Status: DISCONTINUED | OUTPATIENT
Start: 2024-01-03 | End: 2024-01-04 | Stop reason: HOSPADM

## 2024-01-03 RX ORDER — DEXAMETHASONE SODIUM PHOSPHATE 4 MG/ML
INJECTION, SOLUTION INTRA-ARTICULAR; INTRALESIONAL; INTRAMUSCULAR; INTRAVENOUS; SOFT TISSUE AS NEEDED
Status: DISCONTINUED | OUTPATIENT
Start: 2024-01-03 | End: 2024-01-03

## 2024-01-03 RX ORDER — ONDANSETRON HYDROCHLORIDE 2 MG/ML
INJECTION, SOLUTION INTRAVENOUS AS NEEDED
Status: DISCONTINUED | OUTPATIENT
Start: 2024-01-03 | End: 2024-01-03

## 2024-01-03 RX ORDER — PROPOFOL 10 MG/ML
INJECTION, EMULSION INTRAVENOUS CONTINUOUS PRN
Status: DISCONTINUED | OUTPATIENT
Start: 2024-01-03 | End: 2024-01-03

## 2024-01-03 RX ADMIN — SODIUM CHLORIDE, POTASSIUM CHLORIDE, SODIUM LACTATE AND CALCIUM CHLORIDE: 600; 310; 30; 20 INJECTION, SOLUTION INTRAVENOUS at 13:35

## 2024-01-03 RX ADMIN — MIDAZOLAM 2 MG: 1 INJECTION INTRAMUSCULAR; INTRAVENOUS at 13:35

## 2024-01-03 RX ADMIN — Medication 100 MG: at 13:38

## 2024-01-03 RX ADMIN — ONDANSETRON 4 MG: 2 INJECTION, SOLUTION INTRAMUSCULAR; INTRAVENOUS at 13:46

## 2024-01-03 RX ADMIN — PROPOFOL 300 MCG/KG/MIN: 10 INJECTION, EMULSION INTRAVENOUS at 13:36

## 2024-01-03 RX ADMIN — LIDOCAINE HYDROCHLORIDE 100 MG: 20 INJECTION, SOLUTION INFILTRATION; PERINEURAL at 13:38

## 2024-01-03 RX ADMIN — DEXAMETHASONE SODIUM PHOSPHATE 4 MG: 4 INJECTION INTRA-ARTICULAR; INTRALESIONAL; INTRAMUSCULAR; INTRAVENOUS; SOFT TISSUE at 13:46

## 2024-01-03 RX ADMIN — FENTANYL CITRATE 50 MCG: 50 INJECTION, SOLUTION INTRAMUSCULAR; INTRAVENOUS at 13:38

## 2024-01-03 RX ADMIN — FENTANYL CITRATE 50 MCG: 50 INJECTION, SOLUTION INTRAMUSCULAR; INTRAVENOUS at 13:44

## 2024-01-03 SDOH — HEALTH STABILITY: MENTAL HEALTH: CURRENT SMOKER: 0

## 2024-01-03 ASSESSMENT — PAIN SCALES - GENERAL
PAINLEVEL_OUTOF10: 0 - NO PAIN
PAINLEVEL_OUTOF10: 0 - NO PAIN
PAIN_LEVEL: 0
PAINLEVEL_OUTOF10: 0 - NO PAIN

## 2024-01-03 ASSESSMENT — COLUMBIA-SUICIDE SEVERITY RATING SCALE - C-SSRS
1. IN THE PAST MONTH, HAVE YOU WISHED YOU WERE DEAD OR WISHED YOU COULD GO TO SLEEP AND NOT WAKE UP?: NO
2. HAVE YOU ACTUALLY HAD ANY THOUGHTS OF KILLING YOURSELF?: NO
6. HAVE YOU EVER DONE ANYTHING, STARTED TO DO ANYTHING, OR PREPARED TO DO ANYTHING TO END YOUR LIFE?: NO

## 2024-01-03 ASSESSMENT — PAIN - FUNCTIONAL ASSESSMENT: PAIN_FUNCTIONAL_ASSESSMENT: 0-10

## 2024-01-03 NOTE — ANESTHESIA POSTPROCEDURE EVALUATION
Patient: Lori Fry    Procedure Summary       Date: 01/03/24 Room / Location: [facility]    Anesthesia Start: 1335 Anesthesia Stop: 1415    Procedure: BRONCHOSCOPY Diagnosis: Recurrent respiratory infection    Scheduled Providers: Maureen Ag MD Responsible Provider: La Nena Peoples MD    Anesthesia Type: general ASA Status: 2            Anesthesia Type: general    Vitals Value Taken Time   /78 01/03/24 1415   Temp 36 01/03/24 1415   Pulse 103 01/03/24 1415   Resp 16 01/03/24 1415   SpO2 98 01/03/24 1415       Anesthesia Post Evaluation    Patient location during evaluation: PACU  Patient participation: complete - patient cannot participate  Level of consciousness: awake and alert  Pain score: 0  Pain management: adequate  Multimodal analgesia pain management approach  Airway patency: patent  Two or more strategies used to mitigate risk of obstructive sleep apnea  Cardiovascular status: acceptable and stable  Respiratory status: acceptable and room air  Hydration status: acceptable  Postoperative Nausea and Vomiting: none        No notable events documented.

## 2024-01-03 NOTE — DISCHARGE INSTRUCTIONS
The anesthetics, sedatives or narcotics which were given to you today will be acting in your body for the next 24 hours, so you might feel a little sleepy or groggy. This feeling should slowly wear off.   Carefully read and follow the instructions below:   You received sedation today.   Do not drive or operate machinery or power tools of any kind.   No alcoholic beverages today, not even beer or wine.   No over the counter medications that contain alcohol or may cause drowsiness.   Do not make important decisions or sign legal documents.     Do not use Aspirin containing products or non-steroidal medications for the next 24 hours.  (Examples of these types of medications include: Advil, Aleve, Ecotrin, Ibuprofen, Motrin or Naprosyn.  This list is not all-inclusive.  Check with your physician or pharmacist before resuming these medications.)  Tylenol, cough medicine, cough drops or throat lozenges may be used when you are allowed to resume eating and drinking.     Call your physician if any of these symptoms occur:   High fever over 101 degrees or chills (a low grade fever is common for 24 hours)   Rash or hives   Persistent nausea or vomiting   Inability to urinate within 8 hours after the procedure  Go directly to the emergency room if you notice any of the following:   Shortness of breath   Chest pain  Coughing up large amounts of bright red blood greater than a teaspoonful of blood clots (about a teaspoonful for the next 24-48 hours is normal, especially if you had a biopsy)  Resume all normal medications unless directed otherwise by your doctor.    Follow up with your referring physician as previously scheduled.    If you experience any problems or have any questions following discharge, please call [phone].

## 2024-01-03 NOTE — ANESTHESIA PROCEDURE NOTES
Airway  Date/Time: 1/3/2024 1:40 PM  Urgency: elective    Airway not difficult    Staffing  Performed: ELIZABET   Authorized by: La Nena Peoples MD    Performed by: ELIZABET Smith  Patient location during procedure: OR    Indications and Patient Condition  Indications for airway management: anesthesia and airway protection  Spontaneous ventilation: present  Sedation level: deep  Preoxygenated: yes  Patient position: sniffing  MILS maintained throughout  Mask difficulty assessment: 1 - vent by mask    Final Airway Details  Final airway type: endotracheal airway      Successful airway: ETT  Cuffed: yes   Successful intubation technique: video laryngoscopy  Facilitating devices/methods: intubating stylet  Endotracheal tube insertion site: oral  Blade: Tamika  Blade size: #3  ETT size (mm): 8.0  Cormack-Lehane Classification: grade I - full view of glottis  Placement verified by: chest auscultation and capnometry   Inital cuff pressure (cm H2O): 12  Cuff volume (mL): 6  Measured from: lips  ETT to lips (cm): 21  Number of attempts at approach: 1  Number of other approaches attempted: 0

## 2024-01-03 NOTE — ANESTHESIA PREPROCEDURE EVALUATION
Patient: Lori Fry    Procedure Information       Date/Time: 01/03/24 1300    Scheduled providers: Maureen Ag MD    Procedure: BRONCHOSCOPY    Location: [facility]            Relevant Problems   Neuro/Psych   (+) Anxiety      Pulmonary   (+) Pneumonia      Hematology   (+) DLBCL (diffuse large B cell lymphoma) (CMS/HCC)       Clinical information reviewed:   Tobacco  Allergies  Meds  Problems  Med Hx  Surg Hx  OB Status    Fam Hx  Soc Hx        NPO Detail:  NPO/Void Status  Date of Last Liquid: 01/03/24  Time of Last Liquid: 0600  Date of Last Solid: 01/02/24  Time of Last Solid: 2100  Last Intake Type: Clear fluids  Time of Last Void: 1220         Physical Exam    Airway  Mallampati: I  TM distance: >3 FB  Neck ROM: full     Cardiovascular - normal exam  Rhythm: regular  Rate: normal     Dental - normal exam     Pulmonary - normal exam  Breath sounds clear to auscultation     Abdominal   (+) obese  Abdomen: soft  Bowel sounds: normal           Anesthesia Plan    ASA 2     general     The patient is not a current smoker.  Patient was previously instructed to abstain from smoking on day of procedure.  Patient did not smoke on day of procedure.  Education provided regarding risk of obstructive sleep apnea.  intravenous induction   Anesthetic plan and risks discussed with patient.  Use of blood products discussed with patient who.    Plan discussed with CAA.

## 2024-01-04 LAB — PATH REVIEW-CELL CT,FLUID: NORMAL

## 2024-01-05 LAB
ASPERGILLUS GALACTOMANNAN EIA (NON-BLOOD SPECIMEN): 0.11
ASPERGILLUS GALACTOMANNAN EIA (NON-BLOOD SPECIMEN): 0.14

## 2024-01-06 LAB
B-LACTAMASE ORGANISM ISLT: NEGATIVE
BACTERIA SPEC RESP CULT: ABNORMAL
BACTERIA SPEC RESP CULT: ABNORMAL
GRAM STN SPEC: ABNORMAL
GRAM STN SPEC: ABNORMAL

## 2024-01-07 LAB
B-LACTAMASE ORGANISM ISLT: NEGATIVE
BACTERIA SPEC RESP CULT: ABNORMAL
GRAM STN SPEC: ABNORMAL
GRAM STN SPEC: ABNORMAL

## 2024-01-08 DIAGNOSIS — F90.9 ADULT ADHD: ICD-10-CM

## 2024-01-08 DIAGNOSIS — F41.9 ANXIETY: ICD-10-CM

## 2024-01-08 LAB
LABORATORY COMMENT REPORT: NORMAL
LABORATORY COMMENT REPORT: NORMAL
PATH REPORT.FINAL DX SPEC: NORMAL
PATH REPORT.GROSS SPEC: NORMAL
PATH REPORT.RELEVANT HX SPEC: NORMAL
PATH REPORT.TOTAL CANCER: NORMAL

## 2024-01-08 RX ORDER — LISDEXAMFETAMINE DIMESYLATE 30 MG/1
30 CAPSULE ORAL DAILY
Qty: 30 CAPSULE | Refills: 0 | Status: SHIPPED | OUTPATIENT
Start: 2024-01-08 | End: 2024-01-25 | Stop reason: WASHOUT

## 2024-01-08 RX ORDER — ESCITALOPRAM OXALATE 10 MG/1
10 TABLET ORAL DAILY
Qty: 30 TABLET | Refills: 1 | Status: SHIPPED | OUTPATIENT
Start: 2024-01-08 | End: 2024-01-25 | Stop reason: SDUPTHER

## 2024-01-08 NOTE — PROGRESS NOTES
Received refill request for Vyvanse 30mg daily. Refill placed. Reviewed OARRS on 1/8/24. No discrepancies or concerns noted.

## 2024-01-10 LAB
ADENOVIRUS QPCR, VIRC: NOT DETECTED COPIES/ML
CHLAMYDIA.PNEUMONIAE PCR, VIRC: NOT DETECTED
CYTOMEGALOVIRUS DNA PCR, (NON-BLOOD SPECI: NOT DETECTED IU/ML
HUMAN HERPESVIRUS-6 DNA PCR, QUANTITATIVE (NON-BLOOD SPECIMEN): NOT DETECTED COPIES/ML
LEGIONELLA PNEUMO PCR, VIRC: NOT DETECTED
MYCOPLASMA.PNEUMONIAE PCR, VIRC: NOT DETECTED
PAN.LEGIONELLA PCR, VIRC: NOT DETECTED
PNEUMOCYSTIS PCR,QUANTITATIVE (NON-BLOOD SPECIMEN): NOT DETECTED COPIES/ML

## 2024-01-11 ENCOUNTER — TELEPHONE (OUTPATIENT)
Dept: PULMONOLOGY | Facility: CLINIC | Age: 34
End: 2024-01-11
Payer: COMMERCIAL

## 2024-01-12 NOTE — PROGRESS NOTES
Amnisure pending l Patient: Lori Fry    [id number]  : 1990 -- AGE 33 y.o.    Provider: Frances DAUGHERTY- [facility]     Location [facility]   Service Date: 2024         Department of Medicine  Division of Pulmonary, Critical Care, and Sleep Medicine         [facility]  Follow Up Visit Note      Virtual or Telephone Consent  An interactive audio and video telecommunication system which permits real time communications between the patient (at the originating site) and provider (at the distant site) was utilized to provide this telehealth service.   Verbal consent was requested and obtained from Lori Fry on this date, 24 for a telehealth visit.       HISTORY OF PRESENT ILLNESS     HISTORY OF PRESENT ILLNESS   Lori Fry is a 33 y.o. female with a history of Large B Cell lymphoma who is a never smoker, who presents to a [facility] for a follow up evaluation for chronic cough and recurrent respiratory infections.     DATE OF LAST VISIT: 6/15/23    Current History    On today's visit, the patient reports keep she continues to have a productive cough with neon green to dark brown sputum.  Did not obtain sputum samples as discussed at last visit.  Since last visit she had pneumonia in August, was treated with antibiotics. She denies fevers, chills, chest pain and shortness of breath.      6/15/23: Mrs. Fry has a history of B cell lymphoma in . She completed chemotherapy in 2017. She continues to follow with oncology once a year. She reports going to the ED 23 for cough and SOB, she was given duo nebs and IVF for tachycardia. She reports having pneumonia over a month ago, she was given 3 courses of antibiotics and steroids until she finally improved. She reports feeling like she is starting to get another URI. She reports being sick on and off, thinks its from having a 14 month child in day care. She coughs up neon  green to brownish mucus, reports bloody noses and thinks that is why her mucus is sometimes brownish color. She was given an albuterol inhaler when she had pneumonia, she was using it frequently but now she is only needing to use it every other day. Denies wheezing, MOYA, chest pain, fevers, chills. She has allergy symptoms and takes cetirizine and Flonase.     Previous pulmonary history: She had pneumonia in May 2023. Pneumonia 5 years ago.      Inhalers/nebulized medications: albuterol     Hospitalization History: She has not been hospitalized over the last year for breathing related problem.     Sleep history: Denies snoring, apnea, feeling tired during the day or taking naps during the day.        REVIEW OF SYSTEMS     REVIEW OF SYSTEMS  Review of Systems    Constitutional: No fever, no chills, no night sweats.    Eyes: No double vision, no floaters, no dry eyes.   ENT: See HPI.   Neck: No neck stiffness.  Cardiovascular: No sharp chest pain, no heart racing, no leg swelling.  Respiratory: as noted in HPI. .   Integumentary: No rashes or sores.  Neurological: No dizziness, no headaches. Sleeping well.  Psychiatric: No mood changes.       ALLERGIES AND MEDICATIONS     ALLERGIES  No Known Allergies    MEDICATIONS  Current Outpatient Medications   Medication Sig Dispense Refill    albuterol 90 mcg/actuation inhaler Inhale 2 puffs 4 times a day. 18 g 0    cetirizine (ZyrTEC) 10 mg tablet Take by mouth.      escitalopram (Lexapro) 10 mg tablet Take 1 tablet (10 mg) by mouth once daily. 30 tablet 1    fluticasone (Flonase) 50 mcg/actuation nasal spray Administer 1 spray into each nostril once daily. Shake gently. Before first use, prime pump. After use, clean tip and replace cap. 16 g 5    lisdexamfetamine (Vyvanse) 30 mg capsule Take 1 capsule (30 mg) by mouth once daily. 30 capsule 0    multivitamin-min-iron-FA-vit K (Adults Multivitamin) 18 mg iron-400 mcg-25 mcg tablet Take by mouth.       No current  facility-administered medications for this visit.         PAST HISTORY     PAST MEDICAL HISTORY  Large B Cell lymphoma      PAST SURGICAL HISTORY  Past Surgical History:   Procedure Laterality Date    IR LUMBAR PUNCTURE      LYMPH NODE BIOPSY      chest - diagnosed with BCell NHL    WISDOM TOOTH EXTRACTION         IMMUNIZATION HISTORY  Immunization History   Administered Date(s) Administered    Pfizer Purple Cap SARS-CoV-2 03/11/2021, 04/01/2021, 12/16/2021       SOCIAL HISTORY  Tobacco Smoking: never  Illicit drugs: none  Alcohol consumption: none  Pets: 2 dogs    OCCUPATIONAL/ENVIRONMENTAL HISTORY  Occupation: Currently works as .  No have known exposure to asbestos, silica, beryllium or inhaled metals.    FAMILY HISTORY  No have a family history of pulmonary disease.  No have a family history of cancer.      PHYSICAL EXAM     VITAL SIGNS: There were no vitals taken for this visit.     PREVIOUS WEIGHTS:  Wt Readings from Last 3 Encounters:   01/03/24 72.6 kg (160 lb)   06/15/23 72.1 kg (159 lb)   05/26/23 68.9 kg (152 lb)       Physical Exam    Virtual video visit    RESULTS/DATA     Pulmonary Function Test Results     12/2017: FEV1/FVC 0.90/ FEV1 2.24 (70%)/ FVC 2.50 (66%)     Chest Radiograph     XR chest 2 view 05/20/2023    Narrative  Interpreted By:  DAVID ORO MD  STUDY:  Chest Radiographs; 05/20/2023 10:05 AM    INDICATION:  Cough.  Recent pneumonia.    COMPARISON:  XR chest 05/06/2023    ACCESSION NUMBER(S):  [id number]    ORDERING CLINICIAN:  ROD MORIN DO    TECHNIQUE:  Frontal and lateral chest.    FINDINGS:    CARDIOMEDIASTINAL SILHOUETTE:  Cardiomediastinal silhouette is normal in size and configuration.    LUNGS:  Small patchy area of airspace consolidation in the right middle lobe.  Underlying emphysema.  Stable chronic changes in the left midlung zone    ABDOMEN:  No remarkable upper abdominal findings.    BONES:  No acute osseous changes.    Impression  Small patchy area of  airspace consolidation in the right middle lobe.  Correlate clinically for pneumonia.      Signed by Isra Zuluaga MD      Chest CT Scan     Narrative & Impression   Interpreted By:  Candido Rojas,   STUDY:  CT CHEST WO IV CONTRAST;  12/6/2023 9:44 am      INDICATION:  Signs/Symptoms: recurrent URI/ cough  R05.9: Cough J06.9: Recurrent  URI (upper respiratory infection). History of lymphoma.      COMPARISON:  06/09/2023 enhanced thoracic CT.      ACCESSION NUMBER(S):  [id number]      ORDERING CLINICIAN:  DELFINO MALDONADO      TECHNIQUE:  Helical data acquisition of the chest was obtained  without IV  contrast material.  Images were reformatted in axial, coronal, and  sagittal planes.      FINDINGS:  LUNGS AND AIRWAYS:  The trachea and central airways are patent. Lingular and minimal left  upper lobe and medial right middle lobe bronchiectasis and lingular  bronchial wall thickening are noted. Portions of the middle lobe  medial segment bronchi are opacified.      Many round and irregular posteroinferior right upper lobe nodules  that measure up to at least 7 mm have increased in number. A 12 x 6  mm irregular nodule, a few tiny nodules and mild scarring in the left  upper lobe seem stable. Greater probably fibrotic changes in the  lingula are similar to the prior CT. Mild bilateral apical scarring.  Stable peripheral anterolateral right upper lobe micronodule (image  86 series 205). Mild pulmonary hyperinflation without bullae or  blebs. The above findings could reflect MAC complex infection, but  are not specific and should be correlated clinically. No pleural  effusion. Inferolateral right middle lobe and minimal basal left  lower lobe ground-glass changes reported on the prior CT have  resolved.      MEDIASTINUM AND IRMA, LOWER NECK AND AXILLA:  Normal size homogeneous thyroid.      An 18 x 8 x 17 mm right paratracheal lymph node just above the aortic  arch level and other mediastinal nodes are increased  "but still within  normal limits in size. Mild subcarinal adenopathy measures up to 13  mm in short axis AP diameter.      Esophagus appears within normal limits as seen.      HEART AND VESSELS:  The thoracic aorta is normal in course and caliber without vascular  calcifications.      Main pulmonary artery and its branches are normal in caliber.      No coronary artery calcifications are seen. The study is not  optimized for evaluation of coronary arteries.      The cardiac chambers are not enlarged.      No pericardial effusion.      UPPER ABDOMEN:  The visualized subdiaphragmatic structures demonstrate no remarkable  findings.      CHEST WALL AND OSSEOUS STRUCTURES:  There are no suspicious osseous lesions. Sternomanubrial joint  ankylosis.      IMPRESSION:  1. Chronic bilateral pulmonary findings described above might reflect  MAC complex infection, but are not specific.  2. A small right middle lobe infiltrate and minimal left lower lobe  ground-glass changes present in June 2023 have resolved.  Subcentimeter posteroinferior right upper lobe nodular opacities have  increased.  3. Minimal mediastinal adenopathy.      MACRO:  None      Signed by: Candido Rojas 12/7/2023 10:42 AM  Dictation workstation:   [id number]        Echocardiogram     No Testing Done       Labwork   Complete Blood Count  Lab Results   Component Value Date    WBC 19.6 (H) 05/06/2023    HGB 13.5 05/06/2023    HCT 39.4 05/06/2023    MCV 84 05/06/2023     05/06/2023       Peripheral Eosinophil Count/Percentage:   Eosinophils Absolute (x10E9/L)   Date Value   05/06/2023 0.01     Eosinophils % (%)   Date Value   05/06/2023 0.1       Serum Immunoglobulin E:    No results found for: \"IGE\"       ASSESSMENT/PLAN     Ms. Fry is a 33 y.o. female, who has a history of Large B Cell lymphoma who is a never smoker, who presents to a [facility] for a follow up evaluation for chronic cough and recurrent " respiratory infections.   Discussed with Dr. Sanket Ag. Wait for sputum sampes to result. May be need to perform a bronchoscopy.    Problem List and Orders  Diagnoses and all orders for this visit:  Recurrent respiratory infection  -     levoFLOXacin (Levaquin) 500 mg tablet; Take 1 tablet (500 mg) by mouth once daily for 7 days. Take 1 pill daily with food.  -     AFB Culture/Smear  -     Fungal Culture/Smear  -     Respiratory Culture/Smear    Assessment and Plan / Recommendations:  Problem List Items Addressed This Visit    None   Productive Cough/ Recurrent URI  - will get sputum samples  - continue albuterol HFA inhaler or albuterol nebulizer every 4-6 hours as needed  - START course of levofloxacin 500mg for 7 days    Lung Nodules/Respiratory Infection      - will wait for sputum samples to result before deciding to order a bronchoscopy        Will call patient with sputum results    If you have any questions please call the office [phone]    Thank you for visiting the Pulmonary clinic today!   Frances Urena CNP  [phone]

## 2024-01-16 DIAGNOSIS — A31.9 MYCOBACTERIAL INFECTION: Primary | ICD-10-CM

## 2024-01-16 NOTE — PROGRESS NOTES
Spoke with Lori in regards to her positive AFB results. Referring her to Dr. Mauro VASQUEZ physician per Dr Sanket Ag.

## 2024-01-20 DIAGNOSIS — F41.9 ANXIETY: ICD-10-CM

## 2024-01-21 RX ORDER — ESCITALOPRAM OXALATE 10 MG/1
10 TABLET ORAL DAILY
Qty: 30 TABLET | Refills: 1 | OUTPATIENT
Start: 2024-01-21

## 2024-01-22 LAB
FUNGUS SPEC CULT: NORMAL
FUNGUS SPEC CULT: NORMAL
FUNGUS SPEC FUNGUS STN: NORMAL
FUNGUS SPEC FUNGUS STN: NORMAL

## 2024-01-25 ENCOUNTER — APPOINTMENT (OUTPATIENT)
Dept: PULMONOLOGY | Facility: CLINIC | Age: 34
End: 2024-01-25
Payer: COMMERCIAL

## 2024-01-25 ENCOUNTER — TELEMEDICINE (OUTPATIENT)
Dept: BEHAVIORAL HEALTH | Facility: CLINIC | Age: 34
End: 2024-01-25
Payer: COMMERCIAL

## 2024-01-25 DIAGNOSIS — Z00.00 HEALTHCARE MAINTENANCE: ICD-10-CM

## 2024-01-25 DIAGNOSIS — F41.9 ANXIETY: ICD-10-CM

## 2024-01-25 DIAGNOSIS — F90.9 ADULT ADHD: ICD-10-CM

## 2024-01-25 PROCEDURE — 99213 OFFICE O/P EST LOW 20 MIN: CPT

## 2024-01-25 RX ORDER — ESCITALOPRAM OXALATE 10 MG/1
10 TABLET ORAL DAILY
Qty: 90 TABLET | Refills: 1 | Status: SHIPPED | OUTPATIENT
Start: 2024-01-25 | End: 2024-04-10 | Stop reason: SDUPTHER

## 2024-01-25 RX ORDER — LISDEXAMFETAMINE DIMESYLATE 30 MG/1
30 CAPSULE ORAL EVERY MORNING
Qty: 30 CAPSULE | Refills: 0 | Status: SHIPPED | OUTPATIENT
Start: 2024-04-04 | End: 2024-05-13 | Stop reason: SDUPTHER

## 2024-01-25 RX ORDER — LISDEXAMFETAMINE DIMESYLATE 30 MG/1
30 CAPSULE ORAL EVERY MORNING
Qty: 30 CAPSULE | Refills: 0 | Status: SHIPPED | OUTPATIENT
Start: 2024-02-06 | End: 2024-04-10 | Stop reason: SDUPTHER

## 2024-01-25 RX ORDER — LISDEXAMFETAMINE DIMESYLATE 30 MG/1
30 CAPSULE ORAL EVERY MORNING
Qty: 30 CAPSULE | Refills: 0 | Status: SHIPPED | OUTPATIENT
Start: 2024-03-06 | End: 2024-04-10 | Stop reason: SDUPTHER

## 2024-01-25 NOTE — PROGRESS NOTES
"Subjective   Patient ID: Lori Fry is a 33 y.o. female who presents for ADHD, Anxiety, and Med Management Last visit with this writer on 9/28/23.     HPI  Patient arrived for virtual appointment. A/Ox3. When asked how she is doing patient reports \"All good\". \"Sick on and off\" since April with reoccurring resp. Infections and PNA. Following with Pulm and seeing ID today. Partner and child are doing well. Enjoyed recent date night. Positive response with Lexapro - no specific side effects reported. Work described as \"mostly okay\", take time off as needed for medication appointments. No recent visits with Deborah (established counselor) but starting couples counseling with partner in the near future.       Anxiety:   \"Definitely improved\"   Manageable most days   Restlessness/keyed up or on edge: denies   Irritability: denies   Sleep disturbance: endorses, improving   No panic attacks since last visit      - Depression -   Mood: \"fine\"   No anhedonia  Appetite: stable  + non-purposeful weight loss reported with recent illness   Sleep: adequate 7 hours/night   No hopelessness/guilt   No SI/HI     ADHD:   - functioning well at work and home   - Patient feels current dose of Vyvanse is adequate   - tolerating current regiment well   - No tics/tremors  - no CP/SOB  - No increasing anger/anxiety/irritability     Therapy: Deborah Montero (private practice) working to schedule FU to continue care.     Objective   Physical Exam  Constitutional:       Appearance: Normal appearance.   Neurological:      Mental Status: She is alert and oriented to person, place, and time.     General Appearance: Well groomed, appropriate eye contact  Attitude/Behavior: Cooperative  Motor: No psychomotor agitation or retardation, no tremor or other abnormal movements  Speech: Normal rate, volume, prosody  Gait/Station: WFL - Within functional limits  Mood: \"fine\"  Affect: Constricted  Thought Process: Linear, goal directed  Thought Associations: " No loosening of associations  Thought Content: Normal  Perception: No perceptual abnormalities noted  Sensorium: Alert and oriented to person, place, time and situation  Insight: Intact  Judgement: Intact  Cognition: Cognitively intact to conversational testing with respect to attention, orientation, fund of knowledge, recent and remote memory, and language      Lab Review:   not applicable    Assessment/Plan   Problem List Items Addressed This Visit             ICD-10-CM    Adult ADHD F90.9    Anxiety F41.9     Other Visit Diagnoses         Codes    Healthcare maintenance     Z00.00    Relevant Orders    Drug Screen, Urine With Reflex to Confirmation    Amphetamine Confirm, Urine        Lori Fry is a 31 year old female with a history of lymphoma currently residing in [place], [place] with her partner and young child. She is employed full-time as an . Initial visit with this writer on 7/20/22.   [employer] anxiety, ADHD      DSM-5   ADHD  anxiety disorder, unspecified      Current Medications   Lexapro 10mg daily   Vyvanse 30mg daily - last filled on 1/8/24  - Reviewed [place] on 1/25/24, no discrepancies or concerns.  - drug screen completed June 2022  - controlled substance agreement from June 2022   No side effects or ADRs reported     - Mood is stable. No SI/HI   - symptoms of anxiety improving  - repeat drug screen ordered per protocol   - c/w Vyvanse 30mg daily   - c/w Lexapro 10mg daily    - encouraged patient to communicate any questions, concerns or side effects if recognized.   - patient is aware this writer is leaving  March 2024, recommended to continue care with  Psychiatry. Has multiple medical providers at    - Lori is agreeable to plan detailed above    Start time 9:44am  End time 9:58am  Prep/charting time 5min  Total time 19min

## 2024-01-25 NOTE — PATIENT INSTRUCTIONS
Continue Lexapro 10mg daily   Continue Vyvanse 30mg daily   Please complete Drug Screen as ordered  Please call  Psychiatry in February if you have not been re-scheduled

## 2024-01-29 DIAGNOSIS — A31.9 MYCOBACTERIAL INFECTION, UNSPECIFIED: Primary | ICD-10-CM

## 2024-02-07 LAB
ACID FAST STN SPEC: NORMAL
MYCOBACTERIUM SPEC CULT: NORMAL

## 2024-02-13 ENCOUNTER — LAB (OUTPATIENT)
Dept: LAB | Facility: LAB | Age: 34
End: 2024-02-13
Payer: COMMERCIAL

## 2024-02-13 DIAGNOSIS — Z00.00 HEALTHCARE MAINTENANCE: ICD-10-CM

## 2024-02-13 DIAGNOSIS — C83.32 DIFFUSE LARGE B-CELL LYMPHOMA OF INTRATHORACIC LYMPH NODES (MULTI): ICD-10-CM

## 2024-02-13 LAB
ALBUMIN SERPL BCP-MCNC: 4 G/DL (ref 3.4–5)
ALP SERPL-CCNC: 116 U/L (ref 33–110)
ALT SERPL W P-5'-P-CCNC: 29 U/L (ref 7–45)
AMPHETAMINES UR QL SCN: ABNORMAL
ANION GAP SERPL CALC-SCNC: 9 MMOL/L (ref 10–20)
AST SERPL W P-5'-P-CCNC: 20 U/L (ref 9–39)
BARBITURATES UR QL SCN: ABNORMAL
BASOPHILS # BLD AUTO: 0.01 X10*3/UL (ref 0–0.1)
BASOPHILS NFR BLD AUTO: 0.2 %
BENZODIAZ UR QL SCN: ABNORMAL
BILIRUB SERPL-MCNC: 0.3 MG/DL (ref 0–1.2)
BUN SERPL-MCNC: 9 MG/DL (ref 6–23)
BZE UR QL SCN: ABNORMAL
CALCIUM SERPL-MCNC: 8.7 MG/DL (ref 8.6–10.3)
CANNABINOIDS UR QL SCN: ABNORMAL
CHLORIDE SERPL-SCNC: 105 MMOL/L (ref 98–107)
CO2 SERPL-SCNC: 29 MMOL/L (ref 21–32)
CREAT SERPL-MCNC: 0.68 MG/DL (ref 0.5–1.05)
EGFRCR SERPLBLD CKD-EPI 2021: >90 ML/MIN/1.73M*2
EOSINOPHIL # BLD AUTO: 0.09 X10*3/UL (ref 0–0.7)
EOSINOPHIL NFR BLD AUTO: 2 %
ERYTHROCYTE [DISTWIDTH] IN BLOOD BY AUTOMATED COUNT: 13.1 % (ref 11.5–14.5)
FENTANYL+NORFENTANYL UR QL SCN: ABNORMAL
GLUCOSE SERPL-MCNC: 84 MG/DL (ref 74–99)
HCT VFR BLD AUTO: 39.1 % (ref 36–46)
HGB BLD-MCNC: 12.7 G/DL (ref 12–16)
IMM GRANULOCYTES # BLD AUTO: 0.01 X10*3/UL (ref 0–0.7)
IMM GRANULOCYTES NFR BLD AUTO: 0.2 % (ref 0–0.9)
LDH SERPL L TO P-CCNC: 126 U/L (ref 84–246)
LYMPHOCYTES # BLD AUTO: 1.89 X10*3/UL (ref 1.2–4.8)
LYMPHOCYTES NFR BLD AUTO: 42.1 %
MCH RBC QN AUTO: 27.8 PG (ref 26–34)
MCHC RBC AUTO-ENTMCNC: 32.5 G/DL (ref 32–36)
MCV RBC AUTO: 86 FL (ref 80–100)
MONOCYTES # BLD AUTO: 0.3 X10*3/UL (ref 0.1–1)
MONOCYTES NFR BLD AUTO: 6.7 %
NEUTROPHILS # BLD AUTO: 2.19 X10*3/UL (ref 1.2–7.7)
NEUTROPHILS NFR BLD AUTO: 48.8 %
NRBC BLD-RTO: 0 /100 WBCS (ref 0–0)
OPIATES UR QL SCN: ABNORMAL
OXYCODONE+OXYMORPHONE UR QL SCN: ABNORMAL
PCP UR QL SCN: ABNORMAL
PLATELET # BLD AUTO: 175 X10*3/UL (ref 150–450)
POTASSIUM SERPL-SCNC: 3.9 MMOL/L (ref 3.5–5.3)
PROT SERPL-MCNC: 7 G/DL (ref 6.4–8.2)
RBC # BLD AUTO: 4.57 X10*6/UL (ref 4–5.2)
SODIUM SERPL-SCNC: 139 MMOL/L (ref 136–145)
WBC # BLD AUTO: 4.5 X10*3/UL (ref 4.4–11.3)

## 2024-02-13 PROCEDURE — 85025 COMPLETE CBC W/AUTO DIFF WBC: CPT

## 2024-02-13 PROCEDURE — 80324 DRUG SCREEN AMPHETAMINES 1/2: CPT

## 2024-02-13 PROCEDURE — 80307 DRUG TEST PRSMV CHEM ANLYZR: CPT

## 2024-02-13 PROCEDURE — 36415 COLL VENOUS BLD VENIPUNCTURE: CPT

## 2024-02-13 PROCEDURE — 83615 LACTATE (LD) (LDH) ENZYME: CPT

## 2024-02-13 PROCEDURE — 80053 COMPREHEN METABOLIC PANEL: CPT

## 2024-02-17 LAB
AMPHET UR-MCNC: 1423 NG/ML
MDA UR-MCNC: <200 NG/ML
MDEA UR-MCNC: <200 NG/ML
MDMA UR-MCNC: <200 NG/ML
METHAMPHET UR-MCNC: <200 NG/ML
PHENTERMINE UR CFM-MCNC: <200 NG/ML

## 2024-02-23 ENCOUNTER — OFFICE VISIT (OUTPATIENT)
Dept: HEMATOLOGY/ONCOLOGY | Facility: CLINIC | Age: 34
End: 2024-02-23
Payer: COMMERCIAL

## 2024-02-23 ENCOUNTER — LAB (OUTPATIENT)
Dept: LAB | Facility: CLINIC | Age: 34
End: 2024-02-23
Payer: COMMERCIAL

## 2024-02-23 VITALS
TEMPERATURE: 96.8 F | BODY MASS INDEX: 27.18 KG/M2 | DIASTOLIC BLOOD PRESSURE: 74 MMHG | RESPIRATION RATE: 16 BRPM | HEART RATE: 99 BPM | WEIGHT: 148.59 LBS | SYSTOLIC BLOOD PRESSURE: 111 MMHG | OXYGEN SATURATION: 96 %

## 2024-02-23 DIAGNOSIS — A31.0 PULMONARY MAI (MYCOBACTERIUM AVIUM-INTRACELLULARE) INFECTION (MULTI): ICD-10-CM

## 2024-02-23 DIAGNOSIS — A31.9 MYCOBACTERIAL INFECTION, UNSPECIFIED: ICD-10-CM

## 2024-02-23 DIAGNOSIS — F41.9 ANXIETY: ICD-10-CM

## 2024-02-23 DIAGNOSIS — C83.32 DIFFUSE LARGE B-CELL LYMPHOMA OF INTRATHORACIC LYMPH NODES (MULTI): Primary | ICD-10-CM

## 2024-02-23 DIAGNOSIS — F90.9 ADULT ADHD: ICD-10-CM

## 2024-02-23 LAB
ALBUMIN SERPL BCP-MCNC: 4.1 G/DL (ref 3.4–5)
ALP SERPL-CCNC: 84 U/L (ref 33–110)
ALT SERPL W P-5'-P-CCNC: 21 U/L (ref 7–45)
ANION GAP SERPL CALC-SCNC: 10 MMOL/L (ref 10–20)
AST SERPL W P-5'-P-CCNC: 17 U/L (ref 9–39)
BILIRUB SERPL-MCNC: 0.7 MG/DL (ref 0–1.2)
BUN SERPL-MCNC: 12 MG/DL (ref 6–23)
CALCIUM SERPL-MCNC: 9.5 MG/DL (ref 8.6–10.3)
CHLORIDE SERPL-SCNC: 103 MMOL/L (ref 98–107)
CO2 SERPL-SCNC: 29 MMOL/L (ref 21–32)
CREAT SERPL-MCNC: 0.7 MG/DL (ref 0.5–1.05)
EGFRCR SERPLBLD CKD-EPI 2021: >90 ML/MIN/1.73M*2
ERYTHROCYTE [DISTWIDTH] IN BLOOD BY AUTOMATED COUNT: 13.3 % (ref 11.5–14.5)
GLUCOSE SERPL-MCNC: 113 MG/DL (ref 74–99)
HCT VFR BLD AUTO: 39.4 % (ref 36–46)
HGB BLD-MCNC: 12.9 G/DL (ref 12–16)
MCH RBC QN AUTO: 27.8 PG (ref 26–34)
MCHC RBC AUTO-ENTMCNC: 32.7 G/DL (ref 32–36)
MCV RBC AUTO: 85 FL (ref 80–100)
PLATELET # BLD AUTO: 169 X10*3/UL (ref 150–450)
POTASSIUM SERPL-SCNC: 3.8 MMOL/L (ref 3.5–5.3)
PROT SERPL-MCNC: 7.4 G/DL (ref 6.4–8.2)
RBC # BLD AUTO: 4.64 X10*6/UL (ref 4–5.2)
SODIUM SERPL-SCNC: 138 MMOL/L (ref 136–145)
WBC # BLD AUTO: 5 X10*3/UL (ref 4.4–11.3)

## 2024-02-23 PROCEDURE — 80053 COMPREHEN METABOLIC PANEL: CPT

## 2024-02-23 PROCEDURE — 1036F TOBACCO NON-USER: CPT | Performed by: INTERNAL MEDICINE

## 2024-02-23 PROCEDURE — 3008F BODY MASS INDEX DOCD: CPT | Performed by: INTERNAL MEDICINE

## 2024-02-23 PROCEDURE — 99214 OFFICE O/P EST MOD 30 MIN: CPT | Performed by: INTERNAL MEDICINE

## 2024-02-23 PROCEDURE — 85027 COMPLETE CBC AUTOMATED: CPT

## 2024-02-23 PROCEDURE — 36415 COLL VENOUS BLD VENIPUNCTURE: CPT

## 2024-02-23 RX ORDER — AZITHROMYCIN 500 MG/1
TABLET, FILM COATED ORAL DAILY
COMMUNITY

## 2024-02-23 RX ORDER — ETHAMBUTOL HYDROCHLORIDE 400 MG/1
TABLET, FILM COATED ORAL DAILY
COMMUNITY

## 2024-02-23 RX ORDER — RIFAMPIN 300 MG/1
300 CAPSULE ORAL
COMMUNITY

## 2024-02-23 ASSESSMENT — PAIN SCALES - GENERAL: PAINLEVEL: 0-NO PAIN

## 2024-02-23 NOTE — PROGRESS NOTES
Patient ID: Lori Fry is a 33 y.o. female.  Referring Physician: No referring provider defined for this encounter.  Primary Care Provider: Susi Lantigua DO  Visit Type: Follow Up      Subjective    HPI I have an infection in my lung    Review of Systems   Constitutional: Negative.    HENT:  Negative.     Eyes: Negative.    Respiratory: Negative.     Cardiovascular: Negative.    Gastrointestinal: Negative.    Endocrine: Negative.    Genitourinary: Negative.     Musculoskeletal: Negative.    Skin: Negative.    Neurological: Negative.    Hematological: Negative.    Psychiatric/Behavioral: Negative.          Objective   BSA: 1.72 meters squared  /74 (BP Location: Right arm)   Pulse 99   Temp 36 °C (96.8 °F)   Resp 16   Wt 67.4 kg (148 lb 9.4 oz)   SpO2 96%   BMI 27.18 kg/m²      has a past medical history of Allergy status to unspecified drugs, medicaments and biological substances, Anxiety disorder, unspecified, DLBCL (diffuse large B cell lymphoma) (CMS/HCC) (05/26/2023), and Pneumonia (05/26/2023).   has a past surgical history that includes [place] tooth extraction; Lymph node biopsy; and IR lumbar puncture.  No family history on file.  Oncology History    No history exists.       Lori Fry  reports that she has never smoked. She has never used smokeless tobacco.  She  reports current alcohol use.  She  reports no history of drug use.    Physical Exam  Vitals reviewed.   Constitutional:       Appearance: Normal appearance.   HENT:      Head: Normocephalic.      Mouth/Throat:      Mouth: Mucous membranes are moist.   Eyes:      Extraocular Movements: Extraocular movements intact.      Pupils: Pupils are equal, round, and reactive to light.   Cardiovascular:      Rate and Rhythm: Regular rhythm.      Heart sounds: Normal heart sounds.   Pulmonary:      Breath sounds: Normal breath sounds.   Abdominal:      General: Bowel sounds are normal.      Palpations: Abdomen is soft.   Musculoskeletal:          General: Normal range of motion.      Cervical back: Normal range of motion and neck supple.   Skin:     General: Skin is warm.   Neurological:      General: No focal deficit present.      Mental Status: She is alert and oriented to person, place, and time.   Psychiatric:         Behavior: Behavior normal.         WBC   Date/Time Value Ref Range Status   02/13/2024 04:28 PM 4.5 4.4 - 11.3 x10*3/uL Final   05/06/2023 09:45 PM 19.6 (H) 4.4 - 11.3 x10E9/L Final   02/13/2023 02:20 PM 6.6 4.4 - 11.3 x10E9/L Final   03/11/2022 11:55 PM 9.6 4.4 - 11.3 x10E9/L Final     nRBC   Date Value Ref Range Status   02/13/2024 0.0 0.0 - 0.0 /100 WBCs Final   05/06/2023 0.0 0.0 - 0.0 /100 WBC Final   03/11/2022 0.0 0.0 - 0.0 /100 WBC Final   02/20/2020 0.0 0.0 - 0.0 /100 WBC Final     RBC   Date Value Ref Range Status   02/13/2024 4.57 4.00 - 5.20 x10*6/uL Final   05/06/2023 4.67 4.00 - 5.20 x10E12/L Final   02/13/2023 4.65 4.00 - 5.20 x10E12/L Final   03/11/2022 4.11 4.00 - 5.20 x10E12/L Final     Hemoglobin   Date Value Ref Range Status   02/13/2024 12.7 12.0 - 16.0 g/dL Final   05/06/2023 13.5 12.0 - 16.0 g/dL Final   02/13/2023 13.9 12.0 - 16.0 g/dL Final   03/11/2022 12.6 12.0 - 16.0 g/dL Final     Hematocrit   Date Value Ref Range Status   02/13/2024 39.1 36.0 - 46.0 % Final   05/06/2023 39.4 36.0 - 46.0 % Final   02/13/2023 41.3 36.0 - 46.0 % Final   03/11/2022 37.9 36.0 - 46.0 % Final     MCV   Date/Time Value Ref Range Status   02/13/2024 04:28 PM 86 80 - 100 fL Final   05/06/2023 09:45 PM 84 80 - 100 fL Final   02/13/2023 02:20 PM 89 80 - 100 fL Final   03/11/2022 11:55 PM 92 80 - 100 fL Final     MCH   Date/Time Value Ref Range Status   02/13/2024 04:28 PM 27.8 26.0 - 34.0 pg Final     MCHC   Date/Time Value Ref Range Status   02/13/2024 04:28 PM 32.5 32.0 - 36.0 g/dL Final   05/06/2023 09:45 PM 34.3 32.0 - 36.0 g/dL Final   02/13/2023 02:20 PM 33.7 32.0 - 36.0 g/dL Final   03/11/2022 11:55 PM 33.2 32.0 - 36.0 g/dL  "Final     RDW   Date/Time Value Ref Range Status   02/13/2024 04:28 PM 13.1 11.5 - 14.5 % Final   05/06/2023 09:45 PM 12.9 11.5 - 14.5 % Final   02/13/2023 02:20 PM 13.0 11.5 - 14.5 % Final   03/11/2022 11:55 PM 13.2 11.5 - 14.5 % Final     Platelets   Date/Time Value Ref Range Status   02/13/2024 04:28  150 - 450 x10*3/uL Final   05/06/2023 09:45  150 - 450 x10E9/L Final   02/13/2023 02:20  150 - 450 x10E9/L Final   03/11/2022 11:55  (L) 150 - 450 x10E9/L Final     No results found for: \"MPV\"  Neutrophils %   Date/Time Value Ref Range Status   02/13/2024 04:28 PM 48.8 40.0 - 80.0 % Final   05/06/2023 09:45 PM 89.3 40.0 - 80.0 % Final   02/13/2023 02:20 PM 60.8 40.0 - 80.0 % Final   02/04/2022 11:30 AM 68.1 40.0 - 80.0 % Final     Immature Granulocytes %, Automated   Date/Time Value Ref Range Status   02/13/2024 04:28 PM 0.2 0.0 - 0.9 % Final     Comment:     Immature Granulocyte Count (IG) includes promyelocytes, myelocytes and metamyelocytes but does not include bands. Percent differential counts (%) should be interpreted in the context of the absolute cell counts (cells/UL).   05/06/2023 09:45 PM 0.6 0.0 - 0.9 % Final     Comment:      Immature Granulocyte Count (IG) includes promyelocytes,    myelocytes and metamyelocytes but does not include bands.   Percent differential counts (%) should be interpreted in the   context of the absolute cell counts (cells/L).     02/13/2023 02:20 PM 0.2 0.0 - 0.9 % Final     Comment:      Immature Granulocyte Count (IG) includes promyelocytes,    myelocytes and metamyelocytes but does not include bands.   Percent differential counts (%) should be interpreted in the   context of the absolute cell counts (cells/L).     02/04/2022 11:30 AM 0.4 0.0 - 0.9 % Final     Comment:      Immature Granulocyte Count (IG) includes promyelocytes,    myelocytes and metamyelocytes but does not include bands.   Percent differential counts (%) should be interpreted in the   " context of the absolute cell counts (cells/L).       Lymphocytes %   Date/Time Value Ref Range Status   02/13/2024 04:28 PM 42.1 13.0 - 44.0 % Final   05/06/2023 09:45 PM 6.7 13.0 - 44.0 % Final   02/13/2023 02:20 PM 30.6 13.0 - 44.0 % Final   02/04/2022 11:30 AM 22.0 13.0 - 44.0 % Final     Monocytes %   Date/Time Value Ref Range Status   02/13/2024 04:28 PM 6.7 2.0 - 10.0 % Final   05/06/2023 09:45 PM 3.1 2.0 - 10.0 % Final   02/13/2023 02:20 PM 7.4 2.0 - 10.0 % Final   02/04/2022 11:30 AM 8.8 2.0 - 10.0 % Final     Eosinophils %   Date/Time Value Ref Range Status   02/13/2024 04:28 PM 2.0 0.0 - 6.0 % Final   05/06/2023 09:45 PM 0.1 0.0 - 6.0 % Final   02/13/2023 02:20 PM 0.8 0.0 - 6.0 % Final   02/04/2022 11:30 AM 0.6 0.0 - 6.0 % Final     Basophils %   Date/Time Value Ref Range Status   02/13/2024 04:28 PM 0.2 0.0 - 2.0 % Final   05/06/2023 09:45 PM 0.2 0.0 - 2.0 % Final   02/13/2023 02:20 PM 0.2 0.0 - 2.0 % Final   02/04/2022 11:30 AM 0.1 0.0 - 2.0 % Final     Neutrophils Absolute   Date/Time Value Ref Range Status   02/13/2024 04:28 PM 2.19 1.20 - 7.70 x10*3/uL Final     Comment:     Percent differential counts (%) should be interpreted in the context of the absolute cell counts (cells/uL).   05/06/2023 09:45 PM 17.54 (H) 1.20 - 7.70 x10E9/L Final   02/13/2023 02:20 PM 4.02 1.20 - 7.70 x10E9/L Final   02/04/2022 11:30 AM 5.59 1.20 - 7.70 x10E9/L Final     Immature Granulocytes Absolute, Automated   Date/Time Value Ref Range Status   02/13/2024 04:28 PM 0.01 0.00 - 0.70 x10*3/uL Final     Lymphocytes Absolute   Date/Time Value Ref Range Status   02/13/2024 04:28 PM 1.89 1.20 - 4.80 x10*3/uL Final   05/06/2023 09:45 PM 1.32 1.20 - 4.80 x10E9/L Final   02/13/2023 02:20 PM 2.02 1.20 - 4.80 x10E9/L Final   02/04/2022 11:30 AM 1.80 1.20 - 4.80 x10E9/L Final     Monocytes Absolute   Date/Time Value Ref Range Status   02/13/2024 04:28 PM 0.30 0.10 - 1.00 x10*3/uL Final   05/06/2023 09:45 PM 0.61 0.10 - 1.00 x10E9/L  "Final   02/13/2023 02:20 PM 0.49 0.10 - 1.00 x10E9/L Final   02/04/2022 11:30 AM 0.72 0.10 - 1.00 x10E9/L Final     Eosinophils Absolute   Date/Time Value Ref Range Status   02/13/2024 04:28 PM 0.09 0.00 - 0.70 x10*3/uL Final   05/06/2023 09:45 PM 0.01 0.00 - 0.70 x10E9/L Final   02/13/2023 02:20 PM 0.05 0.00 - 0.70 x10E9/L Final   02/04/2022 11:30 AM 0.05 0.00 - 0.70 x10E9/L Final     Basophils Absolute   Date/Time Value Ref Range Status   02/13/2024 04:28 PM 0.01 0.00 - 0.10 x10*3/uL Final   05/06/2023 09:45 PM 0.03 0.00 - 0.10 x10E9/L Final   02/13/2023 02:20 PM 0.01 0.00 - 0.10 x10E9/L Final   02/04/2022 11:30 AM 0.01 0.00 - 0.10 x10E9/L Final       No components found for: \"PT\"  No results found for: \"APTT\"  Medication Documentation Review Audit       Reviewed by Mayra Walton MA (Medical Assistant) on 02/23/24 at 1601      Medication Order Taking? Sig Documenting Provider Last Dose Status   albuterol 90 mcg/actuation inhaler [id number] No Inhale 2 puffs 4 times a day.   Patient not taking: Reported on 2/23/2024    Susi Lantigua, DO Not Taking Active   azithromycin (Zithromax) 500 mg tablet [id number] Yes Take by mouth once daily. Historical Provider, MD Taking Active   cetirizine (ZyrTEC) 10 mg tablet [id number] Yes Take by mouth. Historical Provider, MD Taking Active   escitalopram (Lexapro) 10 mg tablet [id number] Yes Take 1 tablet (10 mg) by mouth once daily. Collin Abraham, APRN-CNP Taking Active   ethambutol (Myambutol) 400 mg tablet [id number]  Take by mouth once daily. Historical Provider, MD  Active   fluticasone (Flonase) 50 mcg/actuation nasal spray [id number] No Administer 1 spray into each nostril once daily. Shake gently. Before first use, prime pump. After use, clean tip and replace cap.   Patient not taking: Reported on 2/23/2024    Susi Lantigua, DO Not Taking Active   lisdexamfetamine (Vyvanse) 30 mg capsule [id number] Yes Take 1 capsule (30 mg) by mouth once daily in the morning. Do not start " before February 6, 2024. Collin Abraham, APRN-CNP Taking Active   lisdexamfetamine (Vyvanse) 30 mg capsule [id number] No Take 1 capsule (30 mg) by mouth once daily in the morning. Do not start before March 6, 2024.   Patient not taking: Reported on 2/23/2024 Do not start before March 6, 2024.    Collin Abraham APRN-CNP Not Taking Active   lisdexamfetamine (Vyvanse) 30 mg capsule [id number] No Take 1 capsule (30 mg) by mouth once daily in the morning. Do not start before April 4, 2024.   Patient not taking: Reported on 2/23/2024 Do not start before April 4, 2024.    DAT Saunders-CNP Not Taking Active   multivitamin-min-iron-FA-vit K (Adults Multivitamin) 18 mg iron-400 mcg-25 mcg tablet [id number] Yes Take by mouth. Historical Provider, MD Taking Active   rifAMPin (Rifadin) 300 mg capsule [id number]  Take 1 capsule (300 mg) by mouth. Historical Provider, MD  Active                   Assessment/Plan    1) diffuse large B cell lymphoma  -diagnosed in 2/2016  -s/p RCHOP x 6  -here for interval followup  -recently recurrent URI/pneumonia was due to SANDRO  -labs done on 2/13/2024 included CBC COMP, LDH  -results reviewed--wbc 4.5 hgb 12.7 plt 175,000, creatinine 0.68, alk phos 116, AST 20, ALT 29,   -limited physical exam done today--no cervical, supraclavicular, axillary adenopathy  -will continue to return annually      2) pulmonary SANDRO  -has had recurrent bouts of pneumonia  -CT scan showed multiple lung nodules as well as bronchiectasis  -had bronchoscopy done by Dr Coles with 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

## 2024-03-04 PROBLEM — A31.0 PULMONARY MAI (MYCOBACTERIUM AVIUM-INTRACELLULARE) INFECTION (MULTI): Status: ACTIVE | Noted: 2024-03-04

## 2024-03-04 ASSESSMENT — ENCOUNTER SYMPTOMS
CONSTITUTIONAL NEGATIVE: 1
GASTROINTESTINAL NEGATIVE: 1
CARDIOVASCULAR NEGATIVE: 1
MUSCULOSKELETAL NEGATIVE: 1
RESPIRATORY NEGATIVE: 1
ENDOCRINE NEGATIVE: 1
PSYCHIATRIC NEGATIVE: 1
HEMATOLOGIC/LYMPHATIC NEGATIVE: 1
EYES NEGATIVE: 1
NEUROLOGICAL NEGATIVE: 1

## 2024-03-06 LAB
ACID FAST STN SPEC: ABNORMAL
MYCOBACTERIUM SPEC CULT: ABNORMAL

## 2024-03-07 LAB
ACID FAST STN SPEC: ABNORMAL
MYCOBACTERIUM SPEC CULT: ABNORMAL

## 2024-04-10 ENCOUNTER — OFFICE VISIT (OUTPATIENT)
Dept: BEHAVIORAL HEALTH | Facility: CLINIC | Age: 34
End: 2024-04-10
Payer: COMMERCIAL

## 2024-04-10 VITALS
HEART RATE: 76 BPM | WEIGHT: 143.3 LBS | BODY MASS INDEX: 26.37 KG/M2 | SYSTOLIC BLOOD PRESSURE: 98 MMHG | HEIGHT: 62 IN | DIASTOLIC BLOOD PRESSURE: 65 MMHG | RESPIRATION RATE: 18 BRPM

## 2024-04-10 DIAGNOSIS — F90.9 ADULT ADHD: ICD-10-CM

## 2024-04-10 DIAGNOSIS — F41.9 ANXIETY: ICD-10-CM

## 2024-04-10 PROCEDURE — 3008F BODY MASS INDEX DOCD: CPT

## 2024-04-10 PROCEDURE — 99215 OFFICE O/P EST HI 40 MIN: CPT

## 2024-04-10 RX ORDER — LISDEXAMFETAMINE DIMESYLATE 30 MG/1
30 CAPSULE ORAL EVERY MORNING
Qty: 30 CAPSULE | Refills: 0 | Status: SHIPPED | OUTPATIENT
Start: 2024-05-10 | End: 2024-05-15 | Stop reason: SDUPTHER

## 2024-04-10 RX ORDER — LISDEXAMFETAMINE DIMESYLATE 30 MG/1
30 CAPSULE ORAL EVERY MORNING
Qty: 30 CAPSULE | Refills: 0 | Status: SHIPPED | OUTPATIENT
Start: 2024-04-10 | End: 2024-05-13 | Stop reason: SDUPTHER

## 2024-04-10 RX ORDER — ESCITALOPRAM OXALATE 10 MG/1
10 TABLET ORAL DAILY
Qty: 90 TABLET | Refills: 1 | Status: SHIPPED | OUTPATIENT
Start: 2024-04-10

## 2024-04-10 RX ORDER — LISDEXAMFETAMINE DIMESYLATE 30 MG/1
30 CAPSULE ORAL EVERY MORNING
Qty: 30 CAPSULE | Refills: 0 | Status: SHIPPED | OUTPATIENT
Start: 2024-06-10 | End: 2024-05-15 | Stop reason: SDUPTHER

## 2024-04-10 ASSESSMENT — ENCOUNTER SYMPTOMS
CONSTITUTIONAL NEGATIVE: 1
EYES NEGATIVE: 1
RESPIRATORY NEGATIVE: 1

## 2024-04-10 NOTE — PROGRESS NOTES
Lori Fry is a 33 y.o. CF  with a hx of ADHD and JACQUIE. She presents today to establish a relationship with a new provider. She was last seen by [facility] in January and her chart was reviewed.       HPI  JACQUIE: Notice that she has been experiencing anxiety since high school. When she feels anxious she experience nausea, vomiting, and fainting. Now when she experience anxiety she has circular thoughts, feels her heart racing, muslce tension, sweatiness, and sleep disturbances. Anxiety has been managed with box breathing, journaling, five senses, and attending the gym helps induce sleep. With the use of escitalopram 10 mg anxiety   ADHD: was dx in 2015 d/t poor organization,has a hard time following through on instructions, interrupts others and finish their sentences, remembers blurting out answers in school, admits to missing details, and delaying task that requires mental sustainability.    Review of Systems   Constitutional: Negative.    HENT: Negative.     Eyes: Negative.    Respiratory: Negative.         Objective   Physical Exam  Psychiatric:         Attention and Perception: Attention normal.         Mood and Affect: Mood normal.         Speech: Speech normal.         Behavior: Behavior is cooperative.         Thought Content: Thought content normal.         Cognition and Memory: Cognition normal.         Judgment: Judgment normal.     Acute risk of self-harm remains low despite current stressors (acute and chronic). No reported thoughts of self-harm plan, or intent. She is future-oriented, feels responsibility for her family, and has no hx of SA or hospitalizations.  Assessment/Plan   Lori Fry is a 34 y/o CF who has a hx of JACQUIE that is currently managed well with escitalopram 10 mg. She also was dx with ADHD in 2015 and her ADHD symptoms are well managed with the use of Lisdexamfetamine 30 mg.  Continue escitalopram 10 mg to target anxiety.  Continue lisdexamfetamine 30 mg daily to target ADHD  symptoms.  Please seek therapy by utilizing [employer]'s website.   Provided with crisis/emergency resources, including Mobile Crisis [phone], Crisis Text Line (text 0GUUG zr [id number]) and the National Suicide Prevention Lifeline hotline 1-974.217.3242. Agrees to call 911 or go to the nearest emergency department if s/he feels unsafe, or has suicidal thinking with a plan or intent.   Advised to call [phone] to report any urgent concerns and/or schedule a sooner follow-up.   /Next appointment: at 1:00

## 2024-04-19 DIAGNOSIS — A31.0 PULMONARY MYCOBACTERIAL INFECTION (MULTI): Primary | ICD-10-CM

## 2024-05-06 ENCOUNTER — HOSPITAL ENCOUNTER (OUTPATIENT)
Dept: RADIOLOGY | Facility: HOSPITAL | Age: 34
Discharge: HOME | End: 2024-05-06
Payer: COMMERCIAL

## 2024-05-06 DIAGNOSIS — A31.0 PULMONARY MYCOBACTERIAL INFECTION (MULTI): ICD-10-CM

## 2024-05-06 PROCEDURE — 2550000001 HC RX 255 CONTRASTS: Performed by: INTERNAL MEDICINE

## 2024-05-06 PROCEDURE — 71260 CT THORAX DX C+: CPT | Performed by: RADIOLOGY

## 2024-05-06 PROCEDURE — 71260 CT THORAX DX C+: CPT

## 2024-05-06 RX ADMIN — IOHEXOL 60 ML: 350 INJECTION, SOLUTION INTRAVENOUS at 17:28

## 2024-05-13 DIAGNOSIS — F90.9 ADULT ADHD: ICD-10-CM

## 2024-05-13 RX ORDER — LISDEXAMFETAMINE DIMESYLATE 30 MG/1
30 CAPSULE ORAL EVERY MORNING
Qty: 30 CAPSULE | Refills: 0 | Status: SHIPPED | OUTPATIENT
Start: 2024-06-12 | End: 2024-07-12

## 2024-05-13 RX ORDER — LISDEXAMFETAMINE DIMESYLATE 30 MG/1
30 CAPSULE ORAL EVERY MORNING
Qty: 30 CAPSULE | Refills: 0 | Status: SHIPPED | OUTPATIENT
Start: 2024-05-13 | End: 2024-06-12

## 2024-05-15 ENCOUNTER — TELEPHONE (OUTPATIENT)
Dept: BEHAVIORAL HEALTH | Facility: CLINIC | Age: 34
End: 2024-05-15
Payer: COMMERCIAL

## 2024-05-15 RX ORDER — LISDEXAMFETAMINE DIMESYLATE 30 MG/1
30 CAPSULE ORAL EVERY MORNING
Qty: 30 CAPSULE | Refills: 0 | Status: SHIPPED | OUTPATIENT
Start: 2024-06-10 | End: 2024-07-10

## 2024-05-15 RX ORDER — LISDEXAMFETAMINE DIMESYLATE 30 MG/1
30 CAPSULE ORAL EVERY MORNING
Qty: 30 CAPSULE | Refills: 0 | Status: SHIPPED | OUTPATIENT
Start: 2024-05-15 | End: 2024-06-14

## 2024-07-09 PROBLEM — J30.2 SEASONAL ALLERGIES: Status: ACTIVE | Noted: 2024-07-09

## 2024-07-09 PROBLEM — R91.8 MULTIPLE PULMONARY NODULES: Status: ACTIVE | Noted: 2024-07-09

## 2024-07-09 PROBLEM — Z20.822 CONTACT WITH AND (SUSPECTED) EXPOSURE TO COVID-19: Status: ACTIVE | Noted: 2023-05-07

## 2024-07-09 PROBLEM — R09.81 NASAL CONGESTION: Status: ACTIVE | Noted: 2024-07-09

## 2024-07-09 PROBLEM — M25.559 ARTHRALGIA OF HIP: Status: ACTIVE | Noted: 2024-07-09

## 2024-07-09 PROBLEM — J34.89 NASAL DISCHARGE: Status: ACTIVE | Noted: 2024-07-09

## 2024-07-09 PROBLEM — M94.0 COSTOCHONDRITIS, ACUTE: Status: ACTIVE | Noted: 2024-07-09

## 2024-07-09 PROBLEM — R06.02 SOB (SHORTNESS OF BREATH): Status: ACTIVE | Noted: 2024-07-09

## 2024-07-09 PROBLEM — J98.8 RESPIRATORY TRACT INFECTION: Status: ACTIVE | Noted: 2024-07-09

## 2024-07-09 PROBLEM — R49.0 HOARSENESS: Status: ACTIVE | Noted: 2024-07-09

## 2024-07-09 PROBLEM — R00.0 TACHYCARDIA, UNSPECIFIED: Status: ACTIVE | Noted: 2023-05-07

## 2024-07-09 PROBLEM — H10.10 ALLERGIC CONJUNCTIVITIS: Status: ACTIVE | Noted: 2024-07-09

## 2024-07-09 PROBLEM — M94.0 COSTOCHONDRITIS: Status: ACTIVE | Noted: 2024-07-09

## 2024-07-09 PROBLEM — K90.49 FOOD INTOLERANCE: Status: ACTIVE | Noted: 2024-07-09

## 2024-07-09 PROBLEM — R09.82 POST-NASAL DRAINAGE: Status: ACTIVE | Noted: 2024-07-09

## 2024-07-09 PROBLEM — M25.551 RIGHT HIP PAIN: Status: ACTIVE | Noted: 2024-07-09

## 2024-07-09 PROBLEM — N92.6 MISSED PERIOD: Status: ACTIVE | Noted: 2024-07-09

## 2024-07-09 PROBLEM — Z85.9 HISTORY OF MALIGNANT NEOPLASM: Status: ACTIVE | Noted: 2024-07-09

## 2024-07-09 PROBLEM — E28.39 PRIMARY OVARIAN INSUFFICIENCY: Status: ACTIVE | Noted: 2024-07-09

## 2024-07-09 PROBLEM — R52 PAIN, UNSPECIFIED: Status: ACTIVE | Noted: 2023-05-07

## 2024-07-09 PROBLEM — N63.0 BREAST LUMP IN FEMALE: Status: ACTIVE | Noted: 2024-07-09

## 2024-07-09 PROBLEM — E55.9 VITAMIN D DEFICIENCY: Status: ACTIVE | Noted: 2024-07-09

## 2024-07-09 PROBLEM — R63.0 ANOREXIA: Status: ACTIVE | Noted: 2023-05-07

## 2024-07-09 PROBLEM — C85.90: Status: ACTIVE | Noted: 2024-07-09

## 2024-07-09 PROBLEM — M62.89 PFD (PELVIC FLOOR DYSFUNCTION): Status: ACTIVE | Noted: 2024-07-09

## 2024-07-09 PROBLEM — M54.50 LOW BACK PAIN: Status: ACTIVE | Noted: 2024-07-09

## 2024-07-09 PROBLEM — R79.89 ABNORMAL LIVER FUNCTION TESTS: Status: ACTIVE | Noted: 2024-07-09

## 2024-07-09 PROBLEM — J47.9 BRONCHIECTASIS (MULTI): Status: ACTIVE | Noted: 2024-07-09

## 2024-07-09 PROBLEM — E83.42 HYPOMAGNESEMIA: Status: ACTIVE | Noted: 2024-07-09

## 2024-07-09 PROBLEM — R91.8 MASS OF LEFT LUNG: Status: ACTIVE | Noted: 2024-07-09

## 2024-07-09 PROBLEM — R05.9 COUGH: Status: ACTIVE | Noted: 2024-07-09

## 2024-07-09 PROBLEM — R09.81 CHRONIC NASAL CONGESTION: Status: ACTIVE | Noted: 2024-07-09

## 2024-07-09 PROBLEM — R91.8 LUNG NODULES: Status: ACTIVE | Noted: 2024-07-09

## 2024-07-09 PROBLEM — U07.1 DISEASE DUE TO SEVERE ACUTE RESPIRATORY SYNDROME CORONAVIRUS 2 (SARS-COV-2): Status: ACTIVE | Noted: 2024-07-09

## 2024-07-09 PROBLEM — N92.0 MENORRHAGIA: Status: ACTIVE | Noted: 2024-07-09

## 2024-07-09 PROBLEM — J06.9 RECURRENT URI (UPPER RESPIRATORY INFECTION): Status: ACTIVE | Noted: 2024-07-09

## 2024-07-09 PROBLEM — R05.9 COUGH, UNSPECIFIED: Status: ACTIVE | Noted: 2023-05-07

## 2024-07-09 PROBLEM — C85.90 NON-HODGKIN'S LYMPHOMA (MULTI): Status: ACTIVE | Noted: 2023-02-13

## 2024-07-09 PROBLEM — J30.9 ALLERGIC RHINITIS: Status: ACTIVE | Noted: 2024-07-09

## 2024-07-09 PROBLEM — E55.9 HYPOVITAMINOSIS D: Status: ACTIVE | Noted: 2024-07-09

## 2024-07-09 PROBLEM — U07.1 COVID-19: Status: ACTIVE | Noted: 2024-07-09

## 2024-07-09 PROBLEM — J01.90 ACUTE SINUSITIS: Status: ACTIVE | Noted: 2023-12-06

## 2024-07-09 PROBLEM — J20.9 ACUTE BRONCHITIS: Status: ACTIVE | Noted: 2024-07-09

## 2024-07-09 PROBLEM — R79.89 ELEVATED LFTS: Status: ACTIVE | Noted: 2024-07-09

## 2024-07-09 PROBLEM — J31.0 CHRONIC RHINITIS: Status: ACTIVE | Noted: 2024-07-09

## 2024-07-09 RX ORDER — CEPHALEXIN 500 MG/1
CAPSULE ORAL
COMMUNITY
Start: 2022-04-07

## 2024-07-09 RX ORDER — CETRORELIX ACETATE 0.25 MG
KIT SUBCUTANEOUS
COMMUNITY

## 2024-07-09 RX ORDER — NIRMATRELVIR AND RITONAVIR 300-100 MG
KIT ORAL EVERY 12 HOURS
COMMUNITY
Start: 2022-07-06 | End: 2024-07-12

## 2024-07-09 RX ORDER — PNV NO.95/FERROUS FUM/FOLIC AC 28MG-0.8MG
TABLET ORAL
COMMUNITY
Start: 2021-08-02 | End: 2024-07-12

## 2024-07-09 RX ORDER — AZITHROMYCIN 250 MG/1
TABLET, FILM COATED ORAL
COMMUNITY
Start: 2023-08-22

## 2024-07-09 RX ORDER — MONTELUKAST SODIUM 10 MG/1
TABLET ORAL
COMMUNITY
Start: 2019-08-21

## 2024-07-09 RX ORDER — LORATADINE 10 MG/1
TABLET ORAL
COMMUNITY

## 2024-07-09 RX ORDER — BENZONATATE 100 MG/1
CAPSULE ORAL
COMMUNITY

## 2024-07-09 RX ORDER — OMEPRAZOLE 40 MG/1
CAPSULE, DELAYED RELEASE ORAL
COMMUNITY
Start: 2017-05-15 | End: 2024-07-12

## 2024-07-09 RX ORDER — ONDANSETRON 4 MG/1
TABLET, ORALLY DISINTEGRATING ORAL
COMMUNITY
Start: 2023-08-22 | End: 2024-07-10 | Stop reason: WASHOUT

## 2024-07-09 RX ORDER — AMOXICILLIN AND CLAVULANATE POTASSIUM 875; 125 MG/1; MG/1
1 TABLET, FILM COATED ORAL 2 TIMES DAILY
COMMUNITY
Start: 2023-08-22 | End: 2023-08-27

## 2024-07-09 RX ORDER — PREDNISONE 50 MG/1
TABLET ORAL
COMMUNITY
Start: 2017-05-08 | End: 2024-07-12

## 2024-07-10 ENCOUNTER — APPOINTMENT (OUTPATIENT)
Dept: BEHAVIORAL HEALTH | Facility: CLINIC | Age: 34
End: 2024-07-10
Payer: COMMERCIAL

## 2024-07-10 DIAGNOSIS — F90.9 ADULT ADHD: ICD-10-CM

## 2024-07-10 PROCEDURE — 99212 OFFICE O/P EST SF 10 MIN: CPT

## 2024-07-10 RX ORDER — LISDEXAMFETAMINE DIMESYLATE 30 MG/1
30 CAPSULE ORAL EVERY MORNING
Qty: 30 CAPSULE | Refills: 0 | Status: SHIPPED | OUTPATIENT
Start: 2024-08-09 | End: 2024-09-08

## 2024-07-10 RX ORDER — LISDEXAMFETAMINE DIMESYLATE 30 MG/1
30 CAPSULE ORAL EVERY MORNING
Qty: 30 CAPSULE | Refills: 0 | Status: SHIPPED | OUTPATIENT
Start: 2024-09-08 | End: 2024-10-08

## 2024-07-10 RX ORDER — LISDEXAMFETAMINE DIMESYLATE 30 MG/1
30 CAPSULE ORAL EVERY MORNING
Qty: 30 CAPSULE | Refills: 0 | Status: SHIPPED | OUTPATIENT
Start: 2024-07-10 | End: 2024-08-09

## 2024-07-10 ASSESSMENT — ENCOUNTER SYMPTOMS: CONSTITUTIONAL NEGATIVE: 1

## 2024-07-10 NOTE — PROGRESS NOTES
Subjective   Patient ID: Lori Fry is a 33 y.o. female who presents for ADHD and JACQUIE.    HPI  With the use of escitalopram 10 mg she remains free of panic attacks and situational anxiety is manageable.  With the use of Lisdexamfetamine 30 mg ADHD symptoms are well managed.    Review of Systems   Constitutional: Negative.          Objective   Physical Exam  Psychiatric:         Attention and Perception: Attention normal.         Mood and Affect: Mood normal.         Speech: Speech normal.         Behavior: Behavior is cooperative.         Thought Content: Thought content normal.         Cognition and Memory: Cognition normal.         Judgment: Judgment normal.         Acute risk of self-harm remains low despite current stressors (acute and chronic). No reported thoughts of self-harm plan, or intent. She is future-oriented, feels responsibility for her family, and has no hx of SA or hospitalizations.  Reviewed OARRS, no discrepancies or concerns. Discussed risk of insomnia, anxiety, agitation, anorexia, autonomic effects, toxicity, psychosis, dependence, tolerance, abuse.        Assessment/Plan   Lori Fry is a 32 y/o CF who has a hx of JACQUIE that is currently managed well with escitalopram 10 mg. She also was dx with ADHD in 2015 and her ADHD symptoms are targeted well with the use of Lisdexamfetamine 30 mg.    Continue escitalopram 10 mg to target anxiety.  Continue lisdexamfetamine 30 mg daily to target ADHD symptoms.  Please seek therapy by utilizing [employer]'s website.   Provided with crisis/emergency resources, including Mobile Crisis [phone], Crisis Text Line (text 9QOML jx 524215) and the National Suicide Prevention Lifeline hotline 1-592.333.4360. Agrees to call 911 or go to the nearest emergency department if s/he feels unsafe, or has suicidal thinking with a plan or intent.   Advised to call [phone] to report any urgent concerns and/or schedule a sooner follow-up.   Next  appointment:10/10 at 1:00

## 2024-07-12 PROBLEM — R53.83 OTHER FATIGUE: Status: ACTIVE | Noted: 2023-05-07

## 2024-07-12 PROBLEM — Z85.72 PERSONAL HISTORY OF NON-HODGKIN LYMPHOMAS: Status: ACTIVE | Noted: 2023-05-07

## 2024-07-12 PROBLEM — N81.89 THIRD-DEGREE PERINEAL LACERATION, POSTPARTUM: Status: ACTIVE | Noted: 2024-07-12

## 2024-07-12 PROBLEM — A31.0 PULMONARY MYCOBACTERIUM AVIUM COMPLEX (MAC) INFECTION (MULTI): Status: ACTIVE | Noted: 2024-01-01

## 2024-07-12 PROBLEM — F90.9 ATTENTION DEFICIT DISORDER OF ADULT WITH HYPERACTIVITY: Status: ACTIVE | Noted: 2023-11-22

## 2024-07-12 PROBLEM — R06.02 SHORTNESS OF BREATH: Status: ACTIVE | Noted: 2023-05-07

## 2024-07-12 PROBLEM — J18.9 CAP (COMMUNITY ACQUIRED PNEUMONIA): Status: ACTIVE | Noted: 2023-05-07

## 2024-07-22 ENCOUNTER — TELEPHONE (OUTPATIENT)
Dept: BEHAVIORAL HEALTH | Facility: CLINIC | Age: 34
End: 2024-07-22
Payer: COMMERCIAL

## 2024-07-22 ENCOUNTER — PATIENT MESSAGE (OUTPATIENT)
Dept: BEHAVIORAL HEALTH | Facility: CLINIC | Age: 34
End: 2024-07-22
Payer: COMMERCIAL

## 2024-07-22 DIAGNOSIS — F90.9 ADULT ADHD: ICD-10-CM

## 2024-07-22 RX ORDER — LISDEXAMFETAMINE DIMESYLATE 30 MG/1
30 CAPSULE ORAL EVERY MORNING
Qty: 30 CAPSULE | Refills: 0 | Status: SHIPPED | OUTPATIENT
Start: 2024-08-21 | End: 2024-09-20

## 2024-07-22 RX ORDER — LISDEXAMFETAMINE DIMESYLATE 30 MG/1
30 CAPSULE ORAL EVERY MORNING
Qty: 30 CAPSULE | Refills: 0 | Status: SHIPPED | OUTPATIENT
Start: 2024-10-21 | End: 2024-11-20

## 2024-07-22 RX ORDER — LISDEXAMFETAMINE DIMESYLATE 30 MG/1
30 CAPSULE ORAL EVERY MORNING
Qty: 30 CAPSULE | Refills: 0 | Status: SHIPPED | OUTPATIENT
Start: 2024-07-22 | End: 2024-08-21

## 2024-07-22 NOTE — PROGRESS NOTES
Request for vyvanse to be sent to a [employer]. Patient did not stated which [facility]. Message left and sent to patient to clarify.

## 2024-08-14 ENCOUNTER — TELEPHONE (OUTPATIENT)
Dept: BEHAVIORAL HEALTH | Facility: CLINIC | Age: 34
End: 2024-08-14
Payer: COMMERCIAL

## 2024-08-14 DIAGNOSIS — F90.9 ADULT ADHD: ICD-10-CM

## 2024-08-14 RX ORDER — LISDEXAMFETAMINE DIMESYLATE 30 MG/1
30 CAPSULE ORAL EVERY MORNING
Qty: 30 CAPSULE | Refills: 0 | Status: SHIPPED | OUTPATIENT
Start: 2024-09-13 | End: 2024-10-13

## 2024-08-14 RX ORDER — LISDEXAMFETAMINE DIMESYLATE 30 MG/1
30 CAPSULE ORAL EVERY MORNING
Qty: 30 CAPSULE | Refills: 0 | Status: SHIPPED | OUTPATIENT
Start: 2024-10-12 | End: 2024-11-11

## 2024-08-14 RX ORDER — LISDEXAMFETAMINE DIMESYLATE 30 MG/1
30 CAPSULE ORAL EVERY MORNING
Qty: 30 CAPSULE | Refills: 0 | Status: SHIPPED | OUTPATIENT
Start: 2024-08-14 | End: 2024-09-13

## 2024-09-09 ENCOUNTER — APPOINTMENT (OUTPATIENT)
Dept: OBSTETRICS AND GYNECOLOGY | Facility: CLINIC | Age: 34
End: 2024-09-09
Payer: COMMERCIAL

## 2024-09-13 ENCOUNTER — APPOINTMENT (OUTPATIENT)
Dept: OBSTETRICS AND GYNECOLOGY | Facility: CLINIC | Age: 34
End: 2024-09-13
Payer: COMMERCIAL

## 2024-09-13 VITALS
DIASTOLIC BLOOD PRESSURE: 64 MMHG | WEIGHT: 159.25 LBS | BODY MASS INDEX: 29.13 KG/M2 | SYSTOLIC BLOOD PRESSURE: 108 MMHG

## 2024-09-13 DIAGNOSIS — Z3A.08 8 WEEKS GESTATION OF PREGNANCY (HHS-HCC): ICD-10-CM

## 2024-09-13 DIAGNOSIS — N81.89 THIRD-DEGREE PERINEAL LACERATION, POSTPARTUM: Primary | ICD-10-CM

## 2024-09-13 DIAGNOSIS — Z34.91 PRENATAL CARE IN FIRST TRIMESTER (HHS-HCC): ICD-10-CM

## 2024-09-13 PROBLEM — R06.02 SHORTNESS OF BREATH: Status: RESOLVED | Noted: 2023-05-07 | Resolved: 2024-09-13

## 2024-09-13 PROBLEM — N92.6 MISSED PERIOD: Status: RESOLVED | Noted: 2024-07-09 | Resolved: 2024-09-13

## 2024-09-13 PROBLEM — E55.9 VITAMIN D DEFICIENCY: Status: RESOLVED | Noted: 2024-07-09 | Resolved: 2024-09-13

## 2024-09-13 PROBLEM — R79.89 ELEVATED LFTS: Status: RESOLVED | Noted: 2024-07-09 | Resolved: 2024-09-13

## 2024-09-13 PROBLEM — M62.89 PFD (PELVIC FLOOR DYSFUNCTION): Status: RESOLVED | Noted: 2024-07-09 | Resolved: 2024-09-13

## 2024-09-13 PROBLEM — R09.82 POST-NASAL DRAINAGE: Status: RESOLVED | Noted: 2024-07-09 | Resolved: 2024-09-13

## 2024-09-13 PROBLEM — R49.0 HOARSENESS: Status: RESOLVED | Noted: 2024-07-09 | Resolved: 2024-09-13

## 2024-09-13 PROBLEM — M94.0 COSTOCHONDRITIS: Status: RESOLVED | Noted: 2024-07-09 | Resolved: 2024-09-13

## 2024-09-13 PROBLEM — R63.0 ANOREXIA: Status: RESOLVED | Noted: 2023-05-07 | Resolved: 2024-09-13

## 2024-09-13 PROBLEM — N92.0 MENORRHAGIA: Status: RESOLVED | Noted: 2024-07-09 | Resolved: 2024-09-13

## 2024-09-13 PROBLEM — J30.2 SEASONAL ALLERGIES: Status: RESOLVED | Noted: 2024-07-09 | Resolved: 2024-09-13

## 2024-09-13 PROBLEM — J47.9 BRONCHIECTASIS (MULTI): Status: RESOLVED | Noted: 2024-07-09 | Resolved: 2024-09-13

## 2024-09-13 PROBLEM — C85.90: Status: RESOLVED | Noted: 2024-07-09 | Resolved: 2024-09-13

## 2024-09-13 PROBLEM — E55.9 HYPOVITAMINOSIS D: Status: RESOLVED | Noted: 2024-07-09 | Resolved: 2024-09-13

## 2024-09-13 PROBLEM — J31.0 CHRONIC RHINITIS: Status: RESOLVED | Noted: 2024-07-09 | Resolved: 2024-09-13

## 2024-09-13 PROBLEM — J20.9 ACUTE BRONCHITIS: Status: RESOLVED | Noted: 2024-07-09 | Resolved: 2024-09-13

## 2024-09-13 PROBLEM — R06.02 SOB (SHORTNESS OF BREATH): Status: RESOLVED | Noted: 2024-07-09 | Resolved: 2024-09-13

## 2024-09-13 PROBLEM — J18.9 CAP (COMMUNITY ACQUIRED PNEUMONIA): Status: RESOLVED | Noted: 2023-05-07 | Resolved: 2024-09-13

## 2024-09-13 PROBLEM — E28.39 PRIMARY OVARIAN INSUFFICIENCY: Status: RESOLVED | Noted: 2024-07-09 | Resolved: 2024-09-13

## 2024-09-13 PROBLEM — R79.89 ABNORMAL LIVER FUNCTION TESTS: Status: RESOLVED | Noted: 2024-07-09 | Resolved: 2024-09-13

## 2024-09-13 PROBLEM — R05.9 COUGH: Status: RESOLVED | Noted: 2024-07-09 | Resolved: 2024-09-13

## 2024-09-13 PROBLEM — R53.83 OTHER FATIGUE: Status: RESOLVED | Noted: 2023-05-07 | Resolved: 2024-09-13

## 2024-09-13 PROBLEM — E83.42 HYPOMAGNESEMIA: Status: RESOLVED | Noted: 2024-07-09 | Resolved: 2024-09-13

## 2024-09-13 PROBLEM — M25.551 RIGHT HIP PAIN: Status: RESOLVED | Noted: 2024-07-09 | Resolved: 2024-09-13

## 2024-09-13 PROBLEM — J98.8 RESPIRATORY TRACT INFECTION: Status: RESOLVED | Noted: 2024-07-09 | Resolved: 2024-09-13

## 2024-09-13 PROBLEM — U07.1 COVID-19: Status: RESOLVED | Noted: 2024-07-09 | Resolved: 2024-09-13

## 2024-09-13 PROBLEM — J30.9 ALLERGIC RHINITIS: Status: RESOLVED | Noted: 2024-07-09 | Resolved: 2024-09-13

## 2024-09-13 PROBLEM — J01.90 ACUTE SINUSITIS: Status: RESOLVED | Noted: 2023-12-06 | Resolved: 2024-09-13

## 2024-09-13 PROBLEM — F41.9 ANXIETY: Status: RESOLVED | Noted: 2023-11-22 | Resolved: 2024-09-13

## 2024-09-13 PROBLEM — R00.0 TACHYCARDIA, UNSPECIFIED: Status: RESOLVED | Noted: 2023-05-07 | Resolved: 2024-09-13

## 2024-09-13 PROBLEM — J18.9 PNEUMONIA: Status: RESOLVED | Noted: 2023-05-26 | Resolved: 2024-09-13

## 2024-09-13 PROBLEM — Z85.9 HISTORY OF MALIGNANT NEOPLASM: Status: RESOLVED | Noted: 2024-07-09 | Resolved: 2024-09-13

## 2024-09-13 PROBLEM — R91.8 MASS OF LEFT LUNG: Status: RESOLVED | Noted: 2024-07-09 | Resolved: 2024-09-13

## 2024-09-13 PROBLEM — C85.90 NON-HODGKIN'S LYMPHOMA: Status: RESOLVED | Noted: 2023-02-13 | Resolved: 2024-09-13

## 2024-09-13 PROBLEM — R91.8 LUNG NODULES: Status: RESOLVED | Noted: 2024-07-09 | Resolved: 2024-09-13

## 2024-09-13 PROBLEM — U07.1 DISEASE DUE TO SEVERE ACUTE RESPIRATORY SYNDROME CORONAVIRUS 2 (SARS-COV-2): Status: RESOLVED | Noted: 2024-07-09 | Resolved: 2024-09-13

## 2024-09-13 PROBLEM — M25.559 ARTHRALGIA OF HIP: Status: RESOLVED | Noted: 2024-07-09 | Resolved: 2024-09-13

## 2024-09-13 PROBLEM — R52 PAIN, UNSPECIFIED: Status: RESOLVED | Noted: 2023-05-07 | Resolved: 2024-09-13

## 2024-09-13 PROBLEM — R09.81 CHRONIC NASAL CONGESTION: Status: RESOLVED | Noted: 2024-07-09 | Resolved: 2024-09-13

## 2024-09-13 PROBLEM — N63.0 BREAST LUMP IN FEMALE: Status: RESOLVED | Noted: 2024-07-09 | Resolved: 2024-09-13

## 2024-09-13 PROBLEM — R05.9 COUGH, UNSPECIFIED: Status: RESOLVED | Noted: 2023-05-07 | Resolved: 2024-09-13

## 2024-09-13 PROBLEM — M94.0 COSTOCHONDRITIS, ACUTE: Status: RESOLVED | Noted: 2024-07-09 | Resolved: 2024-09-13

## 2024-09-13 PROBLEM — J34.89 NASAL DISCHARGE: Status: RESOLVED | Noted: 2024-07-09 | Resolved: 2024-09-13

## 2024-09-13 PROBLEM — R09.81 NASAL CONGESTION: Status: RESOLVED | Noted: 2024-07-09 | Resolved: 2024-09-13

## 2024-09-13 PROBLEM — H10.10 ALLERGIC CONJUNCTIVITIS: Status: RESOLVED | Noted: 2024-07-09 | Resolved: 2024-09-13

## 2024-09-13 PROBLEM — K90.49 FOOD INTOLERANCE: Status: RESOLVED | Noted: 2024-07-09 | Resolved: 2024-09-13

## 2024-09-13 PROBLEM — M54.50 LOW BACK PAIN: Status: RESOLVED | Noted: 2024-07-09 | Resolved: 2024-09-13

## 2024-09-13 PROBLEM — R91.8 MULTIPLE PULMONARY NODULES: Status: RESOLVED | Noted: 2024-07-09 | Resolved: 2024-09-13

## 2024-09-13 PROBLEM — F90.9 ATTENTION DEFICIT DISORDER OF ADULT WITH HYPERACTIVITY: Status: RESOLVED | Noted: 2023-11-22 | Resolved: 2024-09-13

## 2024-09-13 PROBLEM — Z85.72 PERSONAL HISTORY OF NON-HODGKIN LYMPHOMAS: Status: RESOLVED | Noted: 2023-05-07 | Resolved: 2024-09-13

## 2024-09-13 PROBLEM — A31.0 PULMONARY MYCOBACTERIUM AVIUM COMPLEX (MAC) INFECTION (MULTI): Status: RESOLVED | Noted: 2024-01-01 | Resolved: 2024-09-13

## 2024-09-13 PROBLEM — Z20.822 CONTACT WITH AND (SUSPECTED) EXPOSURE TO COVID-19: Status: RESOLVED | Noted: 2023-05-07 | Resolved: 2024-09-13

## 2024-09-13 PROBLEM — J06.9 RECURRENT URI (UPPER RESPIRATORY INFECTION): Status: RESOLVED | Noted: 2024-07-09 | Resolved: 2024-09-13

## 2024-09-13 PROCEDURE — 0500F INITIAL PRENATAL CARE VISIT: CPT | Performed by: OBSTETRICS & GYNECOLOGY

## 2024-09-13 PROCEDURE — 87086 URINE CULTURE/COLONY COUNT: CPT

## 2024-09-13 PROCEDURE — 87591 N.GONORRHOEAE DNA AMP PROB: CPT

## 2024-09-13 PROCEDURE — 87491 CHLMYD TRACH DNA AMP PROBE: CPT

## 2024-09-13 ASSESSMENT — EDINBURGH POSTNATAL DEPRESSION SCALE (EPDS)
I HAVE BEEN SO UNHAPPY THAT I HAVE BEEN CRYING: NO, NEVER
I HAVE BLAMED MYSELF UNNECESSARILY WHEN THINGS WENT WRONG: NO, NEVER
THINGS HAVE BEEN GETTING ON TOP OF ME: NO, I HAVE BEEN COPING AS WELL AS EVER
TOTAL SCORE: 0
THE THOUGHT OF HARMING MYSELF HAS OCCURRED TO ME: NEVER
I HAVE FELT SAD OR MISERABLE: NO, NOT AT ALL
I HAVE BEEN SO UNHAPPY THAT I HAVE HAD DIFFICULTY SLEEPING: NOT AT ALL
I HAVE FELT SCARED OR PANICKY FOR NO GOOD REASON: NO, NOT AT ALL
I HAVE BEEN ABLE TO LAUGH AND SEE THE FUNNY SIDE OF THINGS: AS MUCH AS I ALWAYS COULD
I HAVE LOOKED FORWARD WITH ENJOYMENT TO THINGS: AS MUCH AS I EVER DID
I HAVE BEEN ANXIOUS OR WORRIED FOR NO GOOD REASON: NO, NOT AT ALL

## 2024-09-13 NOTE — PROGRESS NOTES
Subjective   Patient ID [id number]   Lori Fry is a 33 y.o.  at 8w6d with a working estimated date of delivery of 2025, by Last Menstrual Period who presents for an initial prenatal visit.    Her pregnancy is complicated by:  -h/o 3rd degree laceration in last pregnancy, no long term issues  -h/o ADHD and anxiety, previously well controlled on vyvanse and lexapro, stopped with positive pregnancy test  -h/o diffuse large B cell lymphoma   -MAC pneumonia, s/p 8 months of azithryo, rifampin and ethambutol     OB History    Para Term  AB Living   2 1 1     1   SAB IAB Ectopic Multiple Live Births           1      # Outcome Date GA Lbr Jose/2nd Weight Sex Type Anes PTL Lv   2 Current            1 Term 22 40w0d   F Vag-Spont EPI  SIDNEY     [place]  Depression Scale Total: 0    Objective   Physical Exam  Weight: 72.2 kg (159 lb 4 oz)  Expected Total Weight Gain: 7 kg (15 lb)-11.5 kg (25 lb)   Pregravid BMI: 29.07  BP: 108/64    OBGyn Exam  Gen in NAD  TVUS with single IUP with +FCA measuring 8w6d c/w LMP, DOUG 25    Prenatal Labs  pending    Assessment/Plan   32 y/o  at 8.6 wks by LMP c/w FTUS today presenting for new ob visit.  -continue prenatal vitamin  -discussed her vyvanse and lexapro in detail, may want to restart, has follow up with psychiatry in October, will discuss with them as well, discussed r/b and that restarting is appropriate   -desires NIPS, will draw along with prenatal labs at next visit  -will schedule NT scan  -will get flu shot next visit  -h/o 3rd degree laceration with no long term sequelae, desires vaginal delivery  -well aware of practice, [facility], [facility] as delivered her last baby with us    Beba Mabry MD

## 2024-09-14 LAB
C TRACH RRNA SPEC QL NAA+PROBE: NEGATIVE
N GONORRHOEA DNA SPEC QL PROBE+SIG AMP: NEGATIVE

## 2024-09-15 LAB — BACTERIA UR CULT: NORMAL

## 2024-09-19 PROBLEM — R68.89 FLU-LIKE SYMPTOMS: Status: ACTIVE | Noted: 2024-09-19

## 2024-09-19 PROBLEM — E83.42 HYPOMAGNESEMIA: Status: ACTIVE | Noted: 2023-05-07

## 2024-09-19 PROBLEM — A31.0 PULMONARY MYCOBACTERIUM AVIUM COMPLEX (MAC) INFECTION (MULTI): Status: ACTIVE | Noted: 2024-01-01

## 2024-09-19 PROBLEM — R19.7 DIARRHEA: Status: ACTIVE | Noted: 2023-05-07

## 2024-10-10 ENCOUNTER — APPOINTMENT (OUTPATIENT)
Dept: BEHAVIORAL HEALTH | Facility: CLINIC | Age: 34
End: 2024-10-10
Payer: COMMERCIAL

## 2024-10-11 ENCOUNTER — APPOINTMENT (OUTPATIENT)
Dept: BEHAVIORAL HEALTH | Facility: CLINIC | Age: 34
End: 2024-10-11
Payer: COMMERCIAL

## 2024-10-11 DIAGNOSIS — F90.9 ADULT ADHD: ICD-10-CM

## 2024-10-11 PROCEDURE — 99214 OFFICE O/P EST MOD 30 MIN: CPT

## 2024-10-11 ASSESSMENT — ENCOUNTER SYMPTOMS
RESPIRATORY NEGATIVE: 1
EYES NEGATIVE: 1
CONSTITUTIONAL NEGATIVE: 1

## 2024-10-11 NOTE — PROGRESS NOTES
Subjective   Patient ID: Lori Fry is a 33 y.o. female who presents for ADHD and JACQUIE.     Virtual or Telephone Consent    An interactive audio and video telecommunication system which permits real time communications between the patient (at the originating site) and provider (at the distant site) was utilized to provide this telehealth service.   Verbal consent was requested and obtained from Lori Fry on this date, 10/11/24 for a telehealth visit.      HPI  She is now 12 weeks pregnant with the projected due date April 22, 2025, which she is very excited.  When she learned that she was with child she ceased use of her medication regiment. She states that she has been managing situational stressors well but she is concerned about her ADHD symptoms on the job.    Review of Systems   Constitutional: Negative.    HENT: Negative.     Eyes: Negative.    Respiratory: Negative.           Objective   Physical Exam  Psychiatric:         Attention and Perception: Attention normal.         Mood and Affect: Mood normal.         Speech: Speech normal.         Behavior: Behavior is cooperative.         Thought Content: Thought content normal.         Cognition and Memory: Cognition normal.         Judgment: Judgment normal.     Acute risk of self-harm remains low despite current stressors (acute and chronic). No reported thoughts of self-harm plan, or intent. She is future-oriented, feels responsibility for her family, and has no hx of SA or hospitalizations.  Reviewed OARRS, no discrepancies or concerns. Discussed risk of insomnia, anxiety, agitation, anorexia, autonomic effects, toxicity, psychosis, dependence, tolerance, abuse.          Assessment/Plan   Lori Fry is a 34 y/o CF who has a hx of ADHD and JACQUIE. She is currently with child with the expected due date of April 22, 2025. She ceased use of escitalopram 10 mg and states that anxiety is well managed. But she is worried that her ADHD symptoms may not be well  managed on the job without medication during her pregnancy. Her OBGyn is currently agreeing to restart the lisdexamfetamine 30 mg at this time to help target ADHD symptoms.     Discontinue escitalopram 10 mg to target anxiety.  Continue lisdexamfetamine 30 mg daily to target ADHD symptoms.  Please seek therapy by utilizing [employer]'s website.   Provided with crisis/emergency resources, including Mobile Crisis [phone], Crisis Text Line (text 5OMIM cg 321485) and the National Suicide Prevention Lifeline hotline 1-318.519.3339. Agrees to call 911 or go to the nearest emergency department if s/he feels unsafe, or has suicidal thinking with a plan or intent.   Advised to call [phone] to report any urgent concerns and/or schedule a sooner follow-up.   Next appointment:12/13 at 9:30

## 2024-10-15 ENCOUNTER — HOSPITAL ENCOUNTER (OUTPATIENT)
Dept: RADIOLOGY | Facility: CLINIC | Age: 34
Discharge: HOME | End: 2024-10-15
Payer: COMMERCIAL

## 2024-10-15 ENCOUNTER — APPOINTMENT (OUTPATIENT)
Dept: OBSTETRICS AND GYNECOLOGY | Facility: CLINIC | Age: 34
End: 2024-10-15
Payer: COMMERCIAL

## 2024-10-15 VITALS — DIASTOLIC BLOOD PRESSURE: 69 MMHG | SYSTOLIC BLOOD PRESSURE: 106 MMHG | WEIGHT: 163 LBS | BODY MASS INDEX: 29.81 KG/M2

## 2024-10-15 DIAGNOSIS — Z23 ENCOUNTER FOR VACCINATION: ICD-10-CM

## 2024-10-15 DIAGNOSIS — Z34.91 PRENATAL CARE IN FIRST TRIMESTER (HHS-HCC): ICD-10-CM

## 2024-10-15 DIAGNOSIS — Z3A.13 13 WEEKS GESTATION OF PREGNANCY (HHS-HCC): ICD-10-CM

## 2024-10-15 PROCEDURE — 76813 OB US NUCHAL MEAS 1 GEST: CPT

## 2024-10-15 PROCEDURE — 76813 OB US NUCHAL MEAS 1 GEST: CPT | Performed by: MEDICAL GENETICS

## 2024-10-15 PROCEDURE — 90471 IMMUNIZATION ADMIN: CPT | Performed by: OBSTETRICS & GYNECOLOGY

## 2024-10-15 PROCEDURE — 0501F PRENATAL FLOW SHEET: CPT | Performed by: OBSTETRICS & GYNECOLOGY

## 2024-10-15 PROCEDURE — 90656 IIV3 VACC NO PRSV 0.5 ML IM: CPT | Performed by: OBSTETRICS & GYNECOLOGY

## 2024-10-15 RX ORDER — LISDEXAMFETAMINE DIMESYLATE 30 MG/1
30 CAPSULE ORAL EVERY MORNING
Qty: 30 CAPSULE | Refills: 0 | Status: SHIPPED | OUTPATIENT
Start: 2024-11-20 | End: 2024-12-20

## 2024-10-15 RX ORDER — LISDEXAMFETAMINE DIMESYLATE 30 MG/1
30 CAPSULE ORAL EVERY MORNING
Qty: 30 CAPSULE | Refills: 0 | Status: SHIPPED | OUTPATIENT
Start: 2024-12-20 | End: 2025-01-19

## 2024-10-15 NOTE — PROGRESS NOTES
Routine ob at 13.3 wks.  Feeling well.  Normal NT scan this AM.  Plan OB labs and NIPS today. Flu shot given today.  RTC 4 wks.

## 2024-10-17 ENCOUNTER — LAB (OUTPATIENT)
Dept: LAB | Facility: LAB | Age: 34
End: 2024-10-17
Payer: COMMERCIAL

## 2024-10-17 DIAGNOSIS — Z3A.13 13 WEEKS GESTATION OF PREGNANCY (HHS-HCC): ICD-10-CM

## 2024-10-17 LAB
ABO GROUP (TYPE) IN BLOOD: NORMAL
ANTIBODY SCREEN: NORMAL
ERYTHROCYTE [DISTWIDTH] IN BLOOD BY AUTOMATED COUNT: 13.7 % (ref 11.5–14.5)
EST. AVERAGE GLUCOSE BLD GHB EST-MCNC: 85 MG/DL
HBA1C MFR BLD: 4.6 %
HBV SURFACE AG SERPL QL IA: NONREACTIVE
HCT VFR BLD AUTO: 41.5 % (ref 36–46)
HCV AB SER QL: NONREACTIVE
HGB BLD-MCNC: 13.8 G/DL (ref 12–16)
HIV 1+2 AB+HIV1 P24 AG SERPL QL IA: NONREACTIVE
MCH RBC QN AUTO: 28.6 PG (ref 26–34)
MCHC RBC AUTO-ENTMCNC: 33.3 G/DL (ref 32–36)
MCV RBC AUTO: 86 FL (ref 80–100)
NRBC BLD-RTO: 0 /100 WBCS (ref 0–0)
PLATELET # BLD AUTO: 212 X10*3/UL (ref 150–450)
RBC # BLD AUTO: 4.82 X10*6/UL (ref 4–5.2)
REFLEX ADDED, ANEMIA PANEL: NORMAL
RH FACTOR (ANTIGEN D): NORMAL
RUBV IGG SERPL IA-ACNC: 0.8 IA
RUBV IGG SERPL QL IA: NORMAL
TREPONEMA PALLIDUM IGG+IGM AB [PRESENCE] IN SERUM OR PLASMA BY IMMUNOASSAY: NONREACTIVE
WBC # BLD AUTO: 8.4 X10*3/UL (ref 4.4–11.3)

## 2024-10-17 PROCEDURE — 86901 BLOOD TYPING SEROLOGIC RH(D): CPT

## 2024-10-17 PROCEDURE — 87340 HEPATITIS B SURFACE AG IA: CPT

## 2024-10-17 PROCEDURE — 86317 IMMUNOASSAY INFECTIOUS AGENT: CPT

## 2024-10-17 PROCEDURE — 83036 HEMOGLOBIN GLYCOSYLATED A1C: CPT

## 2024-10-17 PROCEDURE — 87389 HIV-1 AG W/HIV-1&-2 AB AG IA: CPT

## 2024-10-17 PROCEDURE — 86780 TREPONEMA PALLIDUM: CPT

## 2024-10-17 PROCEDURE — 85027 COMPLETE CBC AUTOMATED: CPT

## 2024-10-17 PROCEDURE — 86850 RBC ANTIBODY SCREEN: CPT

## 2024-10-17 PROCEDURE — 86900 BLOOD TYPING SEROLOGIC ABO: CPT

## 2024-10-17 PROCEDURE — 36415 COLL VENOUS BLD VENIPUNCTURE: CPT

## 2024-10-17 PROCEDURE — 86803 HEPATITIS C AB TEST: CPT

## 2024-10-28 LAB
COMMENTS - MP RESULT TYPE: NORMAL
SCAN RESULT: NORMAL

## 2024-11-12 ENCOUNTER — APPOINTMENT (OUTPATIENT)
Dept: OBSTETRICS AND GYNECOLOGY | Facility: CLINIC | Age: 34
End: 2024-11-12

## 2024-11-12 VITALS — WEIGHT: 165 LBS | SYSTOLIC BLOOD PRESSURE: 98 MMHG | DIASTOLIC BLOOD PRESSURE: 60 MMHG | BODY MASS INDEX: 30.18 KG/M2

## 2024-11-12 DIAGNOSIS — O21.9 NAUSEA AND VOMITING IN PREGNANCY PRIOR TO 22 WEEKS GESTATION: Primary | ICD-10-CM

## 2024-11-12 DIAGNOSIS — Z3A.17 17 WEEKS GESTATION OF PREGNANCY (HHS-HCC): ICD-10-CM

## 2024-11-12 PROCEDURE — 0501F PRENATAL FLOW SHEET: CPT | Performed by: OBSTETRICS & GYNECOLOGY

## 2024-11-12 RX ORDER — ONDANSETRON 4 MG/1
TABLET, FILM COATED ORAL
Qty: 20 TABLET | Refills: 1 | Status: SHIPPED | OUTPATIENT
Start: 2024-11-12

## 2024-11-12 NOTE — PROGRESS NOTES
Routine ob at 17.3 wks.  Still feeling nauseous, zofran sent to her pharmacy.  Risk reducing NIPS.  Not yet feeling fetal movement.  Has anatomy scan scheduled.  RTC 4 wks.

## 2024-11-25 ENCOUNTER — HOSPITAL ENCOUNTER (OUTPATIENT)
Dept: RADIOLOGY | Facility: CLINIC | Age: 34
Discharge: HOME | End: 2024-11-25
Payer: COMMERCIAL

## 2024-11-25 DIAGNOSIS — Z34.91 PRENATAL CARE IN FIRST TRIMESTER (HHS-HCC): ICD-10-CM

## 2024-12-13 ENCOUNTER — ROUTINE PRENATAL (OUTPATIENT)
Dept: OBSTETRICS AND GYNECOLOGY | Facility: CLINIC | Age: 34
End: 2024-12-13
Payer: COMMERCIAL

## 2024-12-13 ENCOUNTER — APPOINTMENT (OUTPATIENT)
Dept: BEHAVIORAL HEALTH | Facility: CLINIC | Age: 34
End: 2024-12-13

## 2024-12-13 VITALS — BODY MASS INDEX: 31.72 KG/M2 | SYSTOLIC BLOOD PRESSURE: 99 MMHG | DIASTOLIC BLOOD PRESSURE: 67 MMHG | WEIGHT: 173.4 LBS

## 2024-12-13 DIAGNOSIS — Z3A.21 21 WEEKS GESTATION OF PREGNANCY (HHS-HCC): Primary | ICD-10-CM

## 2024-12-13 PROCEDURE — 0501F PRENATAL FLOW SHEET: CPT | Performed by: STUDENT IN AN ORGANIZED HEALTH CARE EDUCATION/TRAINING PROGRAM

## 2024-12-13 NOTE — PROGRESS NOTES
Subjective   Patient ID [id number]   Lori Fry is a 34 y.o.  at 21w6d with a working estimated date of delivery of 2025, by Last Menstrual Period who presents for a routine prenatal visit. Starting to feel FM, no OB complaints.     Objective   Physical Exam  Vitals:    24 0839   BP: 99/67      Weight: 78.7 kg (173 lb 6.4 oz), Pregravid BMI: 29.07  Expected Total Weight Gain: 7 kg (15 lb)-11.5 kg (25 lb)   Fundal height and FHR per prenatal flowsheet    Assessment/Plan     Lori Fry is a 34 y.o.  at 21w6d with a working estimated date of delivery of 2025, by Last Menstrual Period who presents for a routine prenatal visit.    Reviewed anatomy - WNL. Glucola supplies provided for next visit. Starting BMI 29. Has gained 14 lb so far. Reviewed recommended weight gain of 15-25 lb. Remainder of plan per problem list as below.     Problem List Items Addressed This Visit       21 weeks gestation of pregnancy ([facility]) - Primary    Overview     Desired provider in labor: [] CNM  [x] Physician  [x] Blood Products: [x] Yes, accepts [] No, needs counseling  [x] Initial BMI: 29.07   [x] Prenatal Labs: rubella equiv, MMR postpartum  [x] Cervical Cancer Screening up to date  [x] Rh status:  rh+  [x] Genetic Screening: NIPS risk reducing  [x] NT US: (11-13 wks) normal  [x] Baby ASA (if indicated): not indicated  [x] Pregnancy dated by: LMP c/w FTUS    [x] Anatomy US: (19-20 wks)  [] Federal Sterilization consent signed (if indicated):  [] 1hr GCT at 24-28wks:  [] Fetal Surveillance (if indicated):  [] Tdap (27-32 wks, may be given up to 36 wks if initial window missed):   [] RSV (32-36 wks) (Sept. to end of ):   [x] Flu Vaccine: given 10/15  COVID booster recommended    [] Breastfeeding:  [] Postpartum Birth control method:   [] GBS at 36 - 37 wks:  [] 39 weeks discussion of IOL vs. Expectant management:  [x] Mode of delivery ( anticipated ): vaginal          Follow up in 4 weeks for a  routine prenatal visit.    Marsha Maldonado MD

## 2025-01-07 ENCOUNTER — APPOINTMENT (OUTPATIENT)
Dept: OBSTETRICS AND GYNECOLOGY | Facility: CLINIC | Age: 35
End: 2025-01-07
Payer: COMMERCIAL

## 2025-01-21 ENCOUNTER — APPOINTMENT (OUTPATIENT)
Dept: OBSTETRICS AND GYNECOLOGY | Facility: CLINIC | Age: 35
End: 2025-01-21
Payer: COMMERCIAL

## 2025-01-21 VITALS — BODY MASS INDEX: 32.37 KG/M2 | WEIGHT: 177 LBS | SYSTOLIC BLOOD PRESSURE: 106 MMHG | DIASTOLIC BLOOD PRESSURE: 69 MMHG

## 2025-01-21 DIAGNOSIS — Z13.1 SCREENING FOR DIABETES MELLITUS: ICD-10-CM

## 2025-01-21 DIAGNOSIS — Z3A.27 27 WEEKS GESTATION OF PREGNANCY (HHS-HCC): Primary | ICD-10-CM

## 2025-01-21 LAB
ERYTHROCYTE [DISTWIDTH] IN BLOOD BY AUTOMATED COUNT: 13.4 % (ref 11.5–14.5)
GLUCOSE 1H P 50 G GLC PO SERPL-MCNC: 97 MG/DL
HCT VFR BLD AUTO: 36.4 % (ref 36–46)
HGB BLD-MCNC: 12.5 G/DL (ref 12–16)
MCH RBC QN AUTO: 31 PG (ref 26–34)
MCHC RBC AUTO-ENTMCNC: 34.3 G/DL (ref 32–36)
MCV RBC AUTO: 90 FL (ref 80–100)
NRBC BLD-RTO: 0 /100 WBCS (ref 0–0)
PLATELET # BLD AUTO: 172 X10*3/UL (ref 150–450)
RBC # BLD AUTO: 4.03 X10*6/UL (ref 4–5.2)
REFLEX ADDED, ANEMIA PANEL: NORMAL
WBC # BLD AUTO: 7.3 X10*3/UL (ref 4.4–11.3)

## 2025-01-21 PROCEDURE — 85027 COMPLETE CBC AUTOMATED: CPT

## 2025-01-21 PROCEDURE — 82947 ASSAY GLUCOSE BLOOD QUANT: CPT

## 2025-01-21 PROCEDURE — 0501F PRENATAL FLOW SHEET: CPT | Performed by: OBSTETRICS & GYNECOLOGY

## 2025-01-21 NOTE — PROGRESS NOTES
Routine ob at 27.3 wks.  Feeling well. Good FM.  1 hour today. Rh+.  Will get Tdap n/v.  RTC 2 wks.

## 2025-02-10 ENCOUNTER — APPOINTMENT (OUTPATIENT)
Dept: OBSTETRICS AND GYNECOLOGY | Facility: CLINIC | Age: 35
End: 2025-02-10
Payer: COMMERCIAL

## 2025-02-10 VITALS — WEIGHT: 186 LBS | BODY MASS INDEX: 34.02 KG/M2 | SYSTOLIC BLOOD PRESSURE: 101 MMHG | DIASTOLIC BLOOD PRESSURE: 67 MMHG

## 2025-02-10 DIAGNOSIS — Z23 ENCOUNTER FOR VACCINATION: ICD-10-CM

## 2025-02-10 DIAGNOSIS — Z3A.30 30 WEEKS GESTATION OF PREGNANCY (HHS-HCC): Primary | ICD-10-CM

## 2025-02-10 PROCEDURE — 90471 IMMUNIZATION ADMIN: CPT | Performed by: OBSTETRICS & GYNECOLOGY

## 2025-02-10 PROCEDURE — 0501F PRENATAL FLOW SHEET: CPT | Performed by: OBSTETRICS & GYNECOLOGY

## 2025-02-10 PROCEDURE — 90715 TDAP VACCINE 7 YRS/> IM: CPT | Performed by: OBSTETRICS & GYNECOLOGY

## 2025-02-14 ENCOUNTER — OFFICE VISIT (OUTPATIENT)
Dept: URGENT CARE | Age: 35
End: 2025-02-14
Payer: COMMERCIAL

## 2025-02-14 VITALS
SYSTOLIC BLOOD PRESSURE: 112 MMHG | RESPIRATION RATE: 16 BRPM | OXYGEN SATURATION: 96 % | HEART RATE: 62 BPM | TEMPERATURE: 99.2 F | DIASTOLIC BLOOD PRESSURE: 72 MMHG

## 2025-02-14 DIAGNOSIS — J01.90 ACUTE SINUSITIS, RECURRENCE NOT SPECIFIED, UNSPECIFIED LOCATION: Primary | ICD-10-CM

## 2025-02-14 RX ORDER — AMOXICILLIN AND CLAVULANATE POTASSIUM 875; 125 MG/1; MG/1
1 TABLET, FILM COATED ORAL 2 TIMES DAILY
Qty: 20 TABLET | Refills: 0 | Status: SHIPPED | OUTPATIENT
Start: 2025-02-14 | End: 2025-02-21

## 2025-02-14 NOTE — PATIENT INSTRUCTIONS
Pregnant and Breastfeeding Women  Drugs can move from a pregnant woman to her fetus (primarily through the placenta, and drugs can be transmitted through breast milk to the baby. Some such drugs can affect or harm the fetus or baby, so pregnant women and breastfeeding women should consult their doctor or pharmacist before taking any over-the-counter (OTC) drug or medicinal herb. OTC drug labels should be checked because they contain warnings against use during pregnancy and breastfeeding, if applicable.    Certain types of drugs are particularly problematic. They include antihistamines (commonly contained in cough and cold remedies, allergy drugs, motion sickness drugs, and sleep aids) and nonsteroidal anti-inflammatory drugs (NSAIDs). NSAIDs should not be used during the last 3 months of pregnancy unless specified by a doctor, because they may cause problems in the fetus or complications during delivery.    You were seen for cold like symptoms.  You most likely have a bacterial sinus infection   I recommend supportive therapy in addition to the antibiotic prescribed    If not contraindicated by another medical condition or medication-you can try supportive therapy with the medications below.  These medications aim to help reduce symptoms.    1.  You can take Tylenol every 6 hours as needed for fever.   2.  Take antibiotic as prescribed. Take 1 tablet every 12 hours for 7 days.  Dispose of leftover.  3.  Before taking any over-the-counter cold medicine please check with the pharmacist to verify it is okay to take while pregnant  4.  Stay well-hydrated and drink lots of fluids.  5.  Follow-up:  Follow up with your primary care physician in 2-3 days.  6.  Return to ED if symptoms worsen or new symptoms develop.    Sinusitis:  Sinusitis is inflammation of the sinuses, most commonly caused by a viral or bacterial infection or by an allergy.  ° Some of the most common symptoms of sinusitis are pain, tenderness, nasal  congestion, and headache.  ° The diagnosis is based on symptoms, but sometimes a computed tomography scan or other imaging tests are needed.  ° Antibiotics can eliminate an underlying bacterial infection.  Sinusitis is one of the most common medical conditions. Sinusitis may occur in any of the four groups of sinuses: maxillary, ethmoid, frontal, or sphenoid. Sinusitis nearly always occurs in conjunction with inflammation of the nasal passages (rhinitis), and some doctors refer to the disorder as rhinosinusitis. It may be acute (short-lived) or chronic (long-standing).    ° Treatment to improve sinus drainage  ° Medicated nasal sprays  ° Sometimes antibiotics      Treatment of acute sinusitis is aimed at improving sinus drainage and curing the infection. Steam inhalation; hot, wet towels over the affected sinuses; and hot beverages may help relieve the swollen membranes and promote drainage. Flushing a saltwater solution through the nose (nasal irrigation) or using a salt-water spray also can help symptoms.     Nasal sprays, such as phenylephrine or oxymetazoline, which cause swollen membranes to shrink, can be used for a limited time. Similar drugs, such as pseudoephedrine, taken by mouth are not as effective. Corticosteroid nasal sprays also can help relieve symptoms but take at least 10 days to work.    Antibiotics  For acute sinusitis that is severe (3 or more days of symptoms such as fever of 102.2º F [39º C] or higher and severe pain) or persistent (for 10 or more days), antibiotics such as amoxicillin/clavulanate or doxycycline are given.       Gabapentin Pregnancy And Lactation Text: This medication is Pregnancy Category C and isn't considered safe during pregnancy. It is excreted in breast milk.

## 2025-02-14 NOTE — PROGRESS NOTES
Subjective   Patient ID: Lori Fry is a 34 y.o. female. They present today with a chief complaint of Cough (X 2 weeks).    CC: URI symptoms x 2 weeks    HPI: This is a 34-year-old female who is 30 months pregnant presenting for runny nose, coughing, congestion.  Symptoms have been lasting longer than expected.  Cough is mostly dry..  No trismus or drooling.  No difficulty swallowing.  No chest pain, shortness of breath, abdominal pain, nausea, vomiting, diarrhea, rash.  No lower extremity swelling or pain.  No pregnancy related concerns.  No other concerns or complaints.  No known allergies.    Past Medical History  Allergies as of 02/14/2025    (No Known Allergies)     (Not in a hospital admission)    Past Medical History:   Diagnosis Date    Allergy status to unspecified drugs, medicaments and biological substances     History of seasonal allergies    Anxiety disorder, unspecified     Anxiety    DLBCL (diffuse large B cell lymphoma) 05/26/2023    In the chest; had chemo and it made it shrink and the mass is gone.  Sees Oncology once a year    Pneumonia 05/26/2023    Seasonal allergies        Past Surgical History:   Procedure Laterality Date    IR LUMBAR PUNCTURE      LYMPH NODE BIOPSY      chest - diagnosed with BCell NHL    WISDOM TOOTH EXTRACTION          reports that she has never smoked. She has never used smokeless tobacco. She reports that she does not currently use alcohol. She reports that she does not use drugs.    Review of Systems  Review of Systems    After reviewing all body systems I have documented pertinent findings above in the history.  All other Systems reviewed and are negative for complaint.  Pertinent positive and negatives are listed in the above HPI.    Objective    Vitals:    02/14/25 0902   BP: 112/72   Pulse: 62   Resp: 16   Temp: 37.3 °C (99.2 °F)   SpO2: 96%     Patient's last menstrual period was 07/13/2024.    Physical Exam    General: Alert, oriented, and cooperative.  No  acute distress. Well developed, well nourished.     Skin: Skin is warm, and dry. No rashes or lesions.    Eyes: Sclera and conjunctivae normal     Ears: TM´s are intact, pink, with slight effusions bilaterally.  No hemotympanum or rupture. Bilateral auricle, helix, and tragus without inflammation or erythema.  No mastoid erythema, or tenderness.  No deformities. Right and left canal negative for erythema, or discharge.  Hearing is grossly intact bilaterally    Mouth/Throat: Pharynx is erythematous.  Tonsils are equal in size.  No exudates.  No angioedema of the lips or tongue.  No respiratory compromise, tripoding, drooling, muffled voice,  stridor, or trismus.     Neck: Supple.  No lymphadenopathy appreciated    Cardiac: Regular rate and rhythm    Respiratory:  No acute respiratory distress.  Regular rate of breathing.  No accessory muscle use.  No tripoding.  Lungs are clear bilaterally.      Procedures    Point of Care Test & Imaging Results from this visit    No results found.    Diagnostic study results (if any) were reviewed by Gomez Pollack PA-C.    Assessment/Plan   Allergies, medications, history, and pertinent labs/EKGs/Imaging reviewed by Gomez Pollack PA-C.       MDM:  Patient presenting for sinus congestion, and pressure x 2 weeks  30 months pregnant.    Symptoms have been prolonged, and worsening.  Patient does not appear toxic.   No acute distress or respiratory compromise.    No tripoding. No nasal flaring.    No oral lesions, drooling, stridor, or angioedema.   Neck is supple without meningeal signs. Lungs clear.    No clinical signs or history to suggest meningitis, Panfilo´s, peritonsillar abscess, epiglottitis, pneumonia, or asthma.     -Symptoms lasting longer than expected, worsening sinus pressure  -Prescribed antibiotic therapy.  -Advised supportive therapy with over-the-counter cold medications such as antihistamines and decongestants.     -Advised to follow-up with PCP in the next 5  to 7 days especially if no improvement or resolution of symptoms.  -Pt/family instructed to return to the urgent care or emergency department if symptoms worsen or if new symptoms develop.      Orders and Diagnoses  Diagnoses and all orders for this visit:  Acute sinusitis, recurrence not specified, unspecified location  -     amoxicillin-pot clavulanate (Augmentin) 875-125 mg tablet; Take 1 tablet by mouth 2 times a day for 7 days.      Medical Admin Record      Patient disposition: Home    Electronically signed by Gomez Pollack PA-C  9:14 AM

## 2025-02-21 ENCOUNTER — LAB (OUTPATIENT)
Dept: LAB | Facility: CLINIC | Age: 35
End: 2025-02-21
Payer: COMMERCIAL

## 2025-02-21 ENCOUNTER — OFFICE VISIT (OUTPATIENT)
Dept: HEMATOLOGY/ONCOLOGY | Facility: CLINIC | Age: 35
End: 2025-02-21
Payer: COMMERCIAL

## 2025-02-21 VITALS
HEART RATE: 113 BPM | RESPIRATION RATE: 16 BRPM | BODY MASS INDEX: 33.31 KG/M2 | OXYGEN SATURATION: 96 % | WEIGHT: 182.1 LBS | SYSTOLIC BLOOD PRESSURE: 121 MMHG | TEMPERATURE: 97.3 F | DIASTOLIC BLOOD PRESSURE: 78 MMHG

## 2025-02-21 DIAGNOSIS — C83.32 DIFFUSE LARGE B-CELL LYMPHOMA OF INTRATHORACIC LYMPH NODES (MULTI): ICD-10-CM

## 2025-02-21 DIAGNOSIS — F41.9 ANXIETY: ICD-10-CM

## 2025-02-21 DIAGNOSIS — A31.0 PULMONARY MAI (MYCOBACTERIUM AVIUM-INTRACELLULARE) INFECTION (MULTI): Primary | ICD-10-CM

## 2025-02-21 DIAGNOSIS — F90.9 ADULT ADHD: ICD-10-CM

## 2025-02-21 LAB
ALBUMIN SERPL BCP-MCNC: 3.7 G/DL (ref 3.4–5)
ALP SERPL-CCNC: 109 U/L (ref 33–110)
ALT SERPL W P-5'-P-CCNC: 13 U/L (ref 7–45)
ANION GAP SERPL CALC-SCNC: 12 MMOL/L (ref 10–20)
AST SERPL W P-5'-P-CCNC: 12 U/L (ref 9–39)
BASOPHILS # BLD AUTO: 0.01 X10*3/UL (ref 0–0.1)
BASOPHILS NFR BLD AUTO: 0.1 %
BILIRUB SERPL-MCNC: 0.6 MG/DL (ref 0–1.2)
BUN SERPL-MCNC: 8 MG/DL (ref 6–23)
CALCIUM SERPL-MCNC: 8.9 MG/DL (ref 8.6–10.3)
CHLORIDE SERPL-SCNC: 104 MMOL/L (ref 98–107)
CO2 SERPL-SCNC: 24 MMOL/L (ref 21–32)
CREAT SERPL-MCNC: 0.49 MG/DL (ref 0.5–1.05)
EGFRCR SERPLBLD CKD-EPI 2021: >90 ML/MIN/1.73M*2
EOSINOPHIL # BLD AUTO: 0.04 X10*3/UL (ref 0–0.7)
EOSINOPHIL NFR BLD AUTO: 0.5 %
ERYTHROCYTE [DISTWIDTH] IN BLOOD BY AUTOMATED COUNT: 12.6 % (ref 11.5–14.5)
GLUCOSE SERPL-MCNC: 92 MG/DL (ref 74–99)
HCT VFR BLD AUTO: 39.1 % (ref 36–46)
HGB BLD-MCNC: 13.3 G/DL (ref 12–16)
IMM GRANULOCYTES # BLD AUTO: 0.04 X10*3/UL (ref 0–0.7)
IMM GRANULOCYTES NFR BLD AUTO: 0.5 % (ref 0–0.9)
LDH SERPL L TO P-CCNC: 131 U/L (ref 84–246)
LYMPHOCYTES # BLD AUTO: 1.74 X10*3/UL (ref 1.2–4.8)
LYMPHOCYTES NFR BLD AUTO: 23 %
MCH RBC QN AUTO: 30.4 PG (ref 26–34)
MCHC RBC AUTO-ENTMCNC: 34 G/DL (ref 32–36)
MCV RBC AUTO: 90 FL (ref 80–100)
MONOCYTES # BLD AUTO: 0.6 X10*3/UL (ref 0.1–1)
MONOCYTES NFR BLD AUTO: 7.9 %
NEUTROPHILS # BLD AUTO: 5.15 X10*3/UL (ref 1.2–7.7)
NEUTROPHILS NFR BLD AUTO: 68 %
PLATELET # BLD AUTO: 190 X10*3/UL (ref 150–450)
POTASSIUM SERPL-SCNC: 3.8 MMOL/L (ref 3.5–5.3)
PROT SERPL-MCNC: 6.7 G/DL (ref 6.4–8.2)
RBC # BLD AUTO: 4.37 X10*6/UL (ref 4–5.2)
SODIUM SERPL-SCNC: 136 MMOL/L (ref 136–145)
WBC # BLD AUTO: 7.6 X10*3/UL (ref 4.4–11.3)

## 2025-02-21 PROCEDURE — 83615 LACTATE (LD) (LDH) ENZYME: CPT

## 2025-02-21 PROCEDURE — 80053 COMPREHEN METABOLIC PANEL: CPT

## 2025-02-21 PROCEDURE — 85025 COMPLETE CBC W/AUTO DIFF WBC: CPT

## 2025-02-21 PROCEDURE — 99214 OFFICE O/P EST MOD 30 MIN: CPT | Performed by: INTERNAL MEDICINE

## 2025-02-21 PROCEDURE — 36415 COLL VENOUS BLD VENIPUNCTURE: CPT

## 2025-02-21 ASSESSMENT — PAIN SCALES - GENERAL: PAINLEVEL_OUTOF10: 0-NO PAIN

## 2025-02-21 ASSESSMENT — ENCOUNTER SYMPTOMS
GASTROINTESTINAL NEGATIVE: 1
CARDIOVASCULAR NEGATIVE: 1
MUSCULOSKELETAL NEGATIVE: 1
NEUROLOGICAL NEGATIVE: 1
PSYCHIATRIC NEGATIVE: 1
CONSTITUTIONAL NEGATIVE: 1
RESPIRATORY NEGATIVE: 1
ENDOCRINE NEGATIVE: 1
EYES NEGATIVE: 1
HEMATOLOGIC/LYMPHATIC NEGATIVE: 1

## 2025-02-21 NOTE — PROGRESS NOTES
Patient ID: Lori Fry is a 34 y.o. female.  Referring Physician: Mann Martinez MD  [id number]  [address]  [place] [phone]  Primary Care Provider: Susi Lantigua DO  Visit Type: Follow Up      Subjective    HPI I am doing okay    Review of Systems   Constitutional: Negative.    HENT:  Negative.     Eyes: Negative.    Respiratory: Negative.     Cardiovascular: Negative.    Gastrointestinal: Negative.    Endocrine: Negative.    Genitourinary: Negative.     Musculoskeletal: Negative.    Skin: Negative.    Neurological: Negative.    Hematological: Negative.    Psychiatric/Behavioral: Negative.          Objective   BSA: There is no height or weight on file to calculate BSA.  LMP 07/13/2024      has a past medical history of Allergy status to unspecified drugs, medicaments and biological substances, Anxiety disorder, unspecified, DLBCL (diffuse large B cell lymphoma) (05/26/2023), Pneumonia (05/26/2023), and Seasonal allergies.   has a past surgical history that includes [place] tooth extraction; Lymph node biopsy; and IR lumbar puncture.  No family history on file.  Oncology History    No history exists.       Lori Fry  reports that she has never smoked. She has never used smokeless tobacco.  She  reports that she does not currently use alcohol.  She  reports no history of drug use.    Physical Exam  Vitals reviewed.   Constitutional:       Appearance: Normal appearance.   HENT:      Head: Normocephalic.      Mouth/Throat:      Mouth: Mucous membranes are moist.   Eyes:      Extraocular Movements: Extraocular movements intact.      Pupils: Pupils are equal, round, and reactive to light.   Cardiovascular:      Rate and Rhythm: Normal rate and regular rhythm.      Pulses: Normal pulses.      Heart sounds: Normal heart sounds.   Pulmonary:      Effort: Pulmonary effort is normal.      Breath sounds: Normal breath sounds.   Abdominal:      General: There is distension.      Comments: Distended  secondary to pregnancy   Musculoskeletal:         General: Normal range of motion.      Cervical back: Normal range of motion and neck supple.   Skin:     General: Skin is warm.   Neurological:      General: No focal deficit present.      Mental Status: She is alert and oriented to person, place, and time.   Psychiatric:         Mood and Affect: Mood normal.         Behavior: Behavior normal.         WBC   Date/Time Value Ref Range Status   01/21/2025 09:12 AM 7.3 4.4 - 11.3 x10*3/uL Final   10/17/2024 11:17 AM 8.4 4.4 - 11.3 x10*3/uL Final   02/23/2024 04:33 PM 5.0 4.4 - 11.3 x10*3/uL Final     nRBC   Date Value Ref Range Status   01/21/2025 0.0 0.0 - 0.0 /100 WBCs Final   10/17/2024 0.0 0.0 - 0.0 /100 WBCs Final   02/13/2024 0.0 0.0 - 0.0 /100 WBCs Final     RBC   Date Value Ref Range Status   01/21/2025 4.03 4.00 - 5.20 x10*6/uL Final   10/17/2024 4.82 4.00 - 5.20 x10*6/uL Final   02/23/2024 4.64 4.00 - 5.20 x10*6/uL Final     Hemoglobin   Date Value Ref Range Status   01/21/2025 12.5 12.0 - 16.0 g/dL Final   10/17/2024 13.8 12.0 - 16.0 g/dL Final   02/23/2024 12.9 12.0 - 16.0 g/dL Final     Hematocrit   Date Value Ref Range Status   01/21/2025 36.4 36.0 - 46.0 % Final   10/17/2024 41.5 36.0 - 46.0 % Final   02/23/2024 39.4 36.0 - 46.0 % Final     MCV   Date/Time Value Ref Range Status   01/21/2025 09:12 AM 90 80 - 100 fL Final   10/17/2024 11:17 AM 86 80 - 100 fL Final   02/23/2024 04:33 PM 85 80 - 100 fL Final     MCH   Date/Time Value Ref Range Status   01/21/2025 09:12 AM 31.0 26.0 - 34.0 pg Final   10/17/2024 11:17 AM 28.6 26.0 - 34.0 pg Final   02/23/2024 04:33 PM 27.8 26.0 - 34.0 pg Final     MCHC   Date/Time Value Ref Range Status   01/21/2025 09:12 AM 34.3 32.0 - 36.0 g/dL Final   10/17/2024 11:17 AM 33.3 32.0 - 36.0 g/dL Final   02/23/2024 04:33 PM 32.7 32.0 - 36.0 g/dL Final     RDW   Date/Time Value Ref Range Status   01/21/2025 09:12 AM 13.4 11.5 - 14.5 % Final   10/17/2024 11:17 AM 13.7 11.5 - 14.5  "% Final   02/23/2024 04:33 PM 13.3 11.5 - 14.5 % Final     Platelets   Date/Time Value Ref Range Status   01/21/2025 09:12  150 - 450 x10*3/uL Final   10/17/2024 11:17  150 - 450 x10*3/uL Final   02/23/2024 04:33  150 - 450 x10*3/uL Final     No results found for: \"MPV\"  Neutrophils %   Date/Time Value Ref Range Status   02/13/2024 04:28 PM 48.8 40.0 - 80.0 % Final   05/06/2023 09:45 PM 89.3 40.0 - 80.0 % Final   02/13/2023 02:20 PM 60.8 40.0 - 80.0 % Final   02/04/2022 11:30 AM 68.1 40.0 - 80.0 % Final     Immature Granulocytes %, Automated   Date/Time Value Ref Range Status   02/13/2024 04:28 PM 0.2 0.0 - 0.9 % Final     Comment:     Immature Granulocyte Count (IG) includes promyelocytes, myelocytes and metamyelocytes but does not include bands. Percent differential counts (%) should be interpreted in the context of the absolute cell counts (cells/UL).   05/06/2023 09:45 PM 0.6 0.0 - 0.9 % Final     Comment:      Immature Granulocyte Count (IG) includes promyelocytes,    myelocytes and metamyelocytes but does not include bands.   Percent differential counts (%) should be interpreted in the   context of the absolute cell counts (cells/L).     02/13/2023 02:20 PM 0.2 0.0 - 0.9 % Final     Comment:      Immature Granulocyte Count (IG) includes promyelocytes,    myelocytes and metamyelocytes but does not include bands.   Percent differential counts (%) should be interpreted in the   context of the absolute cell counts (cells/L).     02/04/2022 11:30 AM 0.4 0.0 - 0.9 % Final     Comment:      Immature Granulocyte Count (IG) includes promyelocytes,    myelocytes and metamyelocytes but does not include bands.   Percent differential counts (%) should be interpreted in the   context of the absolute cell counts (cells/L).       Lymphocytes %   Date/Time Value Ref Range Status   02/13/2024 04:28 PM 42.1 13.0 - 44.0 % Final   05/06/2023 09:45 PM 6.7 13.0 - 44.0 % Final   02/13/2023 02:20 PM 30.6 13.0 - 44.0 " % Final   02/04/2022 11:30 AM 22.0 13.0 - 44.0 % Final     Monocytes %   Date/Time Value Ref Range Status   02/13/2024 04:28 PM 6.7 2.0 - 10.0 % Final   05/06/2023 09:45 PM 3.1 2.0 - 10.0 % Final   02/13/2023 02:20 PM 7.4 2.0 - 10.0 % Final   02/04/2022 11:30 AM 8.8 2.0 - 10.0 % Final     Eosinophils %   Date/Time Value Ref Range Status   02/13/2024 04:28 PM 2.0 0.0 - 6.0 % Final   05/06/2023 09:45 PM 0.1 0.0 - 6.0 % Final   02/13/2023 02:20 PM 0.8 0.0 - 6.0 % Final   02/04/2022 11:30 AM 0.6 0.0 - 6.0 % Final     Basophils %   Date/Time Value Ref Range Status   02/13/2024 04:28 PM 0.2 0.0 - 2.0 % Final   05/06/2023 09:45 PM 0.2 0.0 - 2.0 % Final   02/13/2023 02:20 PM 0.2 0.0 - 2.0 % Final   02/04/2022 11:30 AM 0.1 0.0 - 2.0 % Final     Neutrophils Absolute   Date/Time Value Ref Range Status   02/13/2024 04:28 PM 2.19 1.20 - 7.70 x10*3/uL Final     Comment:     Percent differential counts (%) should be interpreted in the context of the absolute cell counts (cells/uL).   05/06/2023 09:45 PM 17.54 (H) 1.20 - 7.70 x10E9/L Final   02/13/2023 02:20 PM 4.02 1.20 - 7.70 x10E9/L Final   02/04/2022 11:30 AM 5.59 1.20 - 7.70 x10E9/L Final     Immature Granulocytes Absolute, Automated   Date/Time Value Ref Range Status   02/13/2024 04:28 PM 0.01 0.00 - 0.70 x10*3/uL Final     Lymphocytes Absolute   Date/Time Value Ref Range Status   02/13/2024 04:28 PM 1.89 1.20 - 4.80 x10*3/uL Final   05/06/2023 09:45 PM 1.32 1.20 - 4.80 x10E9/L Final   02/13/2023 02:20 PM 2.02 1.20 - 4.80 x10E9/L Final   02/04/2022 11:30 AM 1.80 1.20 - 4.80 x10E9/L Final     Monocytes Absolute   Date/Time Value Ref Range Status   02/13/2024 04:28 PM 0.30 0.10 - 1.00 x10*3/uL Final   05/06/2023 09:45 PM 0.61 0.10 - 1.00 x10E9/L Final   02/13/2023 02:20 PM 0.49 0.10 - 1.00 x10E9/L Final   02/04/2022 11:30 AM 0.72 0.10 - 1.00 x10E9/L Final     Eosinophils Absolute   Date/Time Value Ref Range Status   02/13/2024 04:28 PM 0.09 0.00 - 0.70 x10*3/uL Final  "  2023 09:45 PM 0.01 0.00 - 0.70 x10E9/L Final   2023 02:20 PM 0.05 0.00 - 0.70 x10E9/L Final   2022 11:30 AM 0.05 0.00 - 0.70 x10E9/L Final     Basophils Absolute   Date/Time Value Ref Range Status   2024 04:28 PM 0.01 0.00 - 0.10 x10*3/uL Final   2023 09:45 PM 0.03 0.00 - 0.10 x10E9/L Final   2023 02:20 PM 0.01 0.00 - 0.10 x10E9/L Final   2022 11:30 AM 0.01 0.00 - 0.10 x10E9/L Final       No components found for: \"PT\"  No results found for: \"APTT\"  Medication Documentation Review Audit       Reviewed by Shira Lion MA (Medical Assistant) on 25 at 1015      Medication Order Taking? Sig Documenting Provider Last Dose Status   amoxicillin-pot clavulanate (Augmentin) 875-125 mg tablet [id number] Yes Take 1 tablet by mouth 2 times a day for 7 days. Gomez Pollack PA-C  Active   escitalopram (Lexapro) 10 mg tablet [id number]  TAKE 1 TABLET BY MOUTH EVERY DAY   Patient not taking: Reported on 2025    DAT Harrell-CNP  Active   lisdexamfetamine (Vyvanse) 30 mg capsule [id number]  Take 1 capsule (30 mg) by mouth once daily in the morning. Do not fill before 2024. DAT Harrell-CNP   24   lisdexamfetamine (Vyvanse) 30 mg capsule [id number]  Take 1 capsule (30 mg) by mouth once daily in the morning. Do not fill before 2024. DAT Harrell-CNP   24   lisdexamfetamine (Vyvanse) 30 mg capsule [id number]  Take 1 capsule (30 mg) by mouth once daily in the morning. Do not fill before 2024.   Patient not taking: Reported on 2024    DAT Harrell-CNP   24 235   lisdexamfetamine (Vyvanse) 30 mg capsule [id number]  Take 1 capsule (30 mg) by mouth once daily in the morning. Do not fill before 2024.   Patient not taking: Reported on 2024 Do not fill before 2024.    Jayleen Johnson, DAT-CNP   25 235   ondansetron " (Zofran) 4 mg tablet [id number]  Take 1 tablet (4 mg) by mouth every 6 hours as needed for nausea/vomiting   Patient not taking: Reported on 2/21/2025    Beba Mabry MD  Active   prenatal vit no.124/iron/folic (PRENATAL VITAMIN ORAL) [id number] Yes Take 1 tablet by mouth once daily. Historical Provider, MD  Active                   Assessment/Plan    1) diffuse large B cell lymphoma  -diagnosed in 2/2016  -s/p RCHOP x 6  -here for interval followup  -recently recurrent URI/pneumonia was due to SANDRO--completed course of therapy  -recovering from another typical URI (last day augmentin today)  -daughter Grace is almost 3  -currently 31 weeks pregnant with daughter #2  -labs to be done today included CBC COMP, LDH  -results reviewed--wbc 7.6 hgb 13.3 plt 190,000, ANC 5150, creatinine 0.49, alk phos 109, AST 12, ALT 13, LDH pending  -limited physical exam done today--no cervical, supraclavicular, axillary adenopathy  -will continue to return annually      2) pulmonary SANDRO  -has had recurrent bouts of pneumonia  -CT scan showed multiple lung nodules as well as bronchiectasis  -had bronchoscopy done by Dr Coles with 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

## 2025-03-18 ENCOUNTER — APPOINTMENT (OUTPATIENT)
Dept: OBSTETRICS AND GYNECOLOGY | Facility: CLINIC | Age: 35
End: 2025-03-18
Payer: COMMERCIAL

## 2025-03-18 VITALS — WEIGHT: 187 LBS | BODY MASS INDEX: 34.2 KG/M2 | SYSTOLIC BLOOD PRESSURE: 109 MMHG | DIASTOLIC BLOOD PRESSURE: 72 MMHG

## 2025-03-18 DIAGNOSIS — Z3A.35 35 WEEKS GESTATION OF PREGNANCY (HHS-HCC): Primary | ICD-10-CM

## 2025-03-18 PROCEDURE — 0501F PRENATAL FLOW SHEET: CPT | Performed by: OBSTETRICS & GYNECOLOGY

## 2025-03-26 ENCOUNTER — APPOINTMENT (OUTPATIENT)
Dept: OBSTETRICS AND GYNECOLOGY | Facility: CLINIC | Age: 35
End: 2025-03-26
Payer: COMMERCIAL

## 2025-03-26 ENCOUNTER — PREP FOR PROCEDURE (OUTPATIENT)
Dept: OBSTETRICS AND GYNECOLOGY | Facility: HOSPITAL | Age: 35
End: 2025-03-26

## 2025-03-26 VITALS — DIASTOLIC BLOOD PRESSURE: 80 MMHG | BODY MASS INDEX: 34.75 KG/M2 | SYSTOLIC BLOOD PRESSURE: 116 MMHG | WEIGHT: 190 LBS

## 2025-03-26 DIAGNOSIS — Z3A.40 40 WEEKS GESTATION OF PREGNANCY (HHS-HCC): Primary | ICD-10-CM

## 2025-03-26 DIAGNOSIS — Z3A.36 36 WEEKS GESTATION OF PREGNANCY (HHS-HCC): ICD-10-CM

## 2025-03-26 PROCEDURE — 0501F PRENATAL FLOW SHEET: CPT | Performed by: OBSTETRICS & GYNECOLOGY

## 2025-03-26 NOTE — PROGRESS NOTES
Routine ob at 36.4 wks. Feeling well.  Good FM. GBS done. SVE 1.5/50/-2, vertex.  Desires IOL for 4/21, prep done.

## 2025-03-29 LAB — GP B STREP SPEC QL CULT: NORMAL

## 2025-03-31 ENCOUNTER — APPOINTMENT (OUTPATIENT)
Dept: OBSTETRICS AND GYNECOLOGY | Facility: CLINIC | Age: 35
End: 2025-03-31
Payer: COMMERCIAL

## 2025-03-31 VITALS — WEIGHT: 193 LBS | BODY MASS INDEX: 35.3 KG/M2 | SYSTOLIC BLOOD PRESSURE: 117 MMHG | DIASTOLIC BLOOD PRESSURE: 74 MMHG

## 2025-03-31 DIAGNOSIS — Z3A.37 37 WEEKS GESTATION OF PREGNANCY (HHS-HCC): Primary | ICD-10-CM

## 2025-03-31 PROCEDURE — 0501F PRENATAL FLOW SHEET: CPT | Performed by: OBSTETRICS & GYNECOLOGY

## 2025-03-31 NOTE — PROGRESS NOTES
Routine ob at 37.2 wks.  Feeling well. Good FM.  GBS neg.  Labor precautions.  Has IOL scheduled 4/21.

## 2025-04-09 ENCOUNTER — APPOINTMENT (OUTPATIENT)
Dept: OBSTETRICS AND GYNECOLOGY | Facility: CLINIC | Age: 35
End: 2025-04-09
Payer: COMMERCIAL

## 2025-04-09 VITALS — SYSTOLIC BLOOD PRESSURE: 105 MMHG | WEIGHT: 194 LBS | BODY MASS INDEX: 35.48 KG/M2 | DIASTOLIC BLOOD PRESSURE: 73 MMHG

## 2025-04-09 DIAGNOSIS — Z3A.38 38 WEEKS GESTATION OF PREGNANCY (HHS-HCC): Primary | ICD-10-CM

## 2025-04-09 PROCEDURE — 0501F PRENATAL FLOW SHEET: CPT | Performed by: OBSTETRICS & GYNECOLOGY

## 2025-04-11 DIAGNOSIS — K21.9 GASTROESOPHAGEAL REFLUX DISEASE WITHOUT ESOPHAGITIS: Primary | ICD-10-CM

## 2025-04-11 RX ORDER — FAMOTIDINE 20 MG/1
20 TABLET, FILM COATED ORAL 2 TIMES DAILY
Qty: 90 TABLET | Refills: 3 | Status: SHIPPED | OUTPATIENT
Start: 2025-04-11 | End: 2026-04-11

## 2025-04-14 ENCOUNTER — APPOINTMENT (OUTPATIENT)
Dept: OBSTETRICS AND GYNECOLOGY | Facility: CLINIC | Age: 35
End: 2025-04-14
Payer: COMMERCIAL

## 2025-04-14 VITALS — DIASTOLIC BLOOD PRESSURE: 72 MMHG | BODY MASS INDEX: 36.21 KG/M2 | SYSTOLIC BLOOD PRESSURE: 111 MMHG | WEIGHT: 198 LBS

## 2025-04-14 DIAGNOSIS — Z3A.39 39 WEEKS GESTATION OF PREGNANCY (HHS-HCC): Primary | ICD-10-CM

## 2025-04-14 PROCEDURE — 0501F PRENATAL FLOW SHEET: CPT | Performed by: OBSTETRICS & GYNECOLOGY

## 2025-04-14 NOTE — PROGRESS NOTES
Routine ob at 39.2 wks.  Feeling well. Good FM.  Has IOL 4/21.  Labor precautions.  RTC for postpartum exam or earlier with any concerns.    Beba Mabry MD

## 2025-04-21 ENCOUNTER — ANESTHESIA (OUTPATIENT)
Dept: OBSTETRICS AND GYNECOLOGY | Facility: HOSPITAL | Age: 35
End: 2025-04-21
Payer: COMMERCIAL

## 2025-04-21 ENCOUNTER — HOSPITAL ENCOUNTER (INPATIENT)
Facility: HOSPITAL | Age: 35
LOS: 1 days | Discharge: HOME | End: 2025-04-22
Attending: OBSTETRICS & GYNECOLOGY | Admitting: OBSTETRICS & GYNECOLOGY
Payer: COMMERCIAL

## 2025-04-21 ENCOUNTER — APPOINTMENT (OUTPATIENT)
Dept: OBSTETRICS AND GYNECOLOGY | Facility: CLINIC | Age: 35
End: 2025-04-21
Payer: COMMERCIAL

## 2025-04-21 ENCOUNTER — ANESTHESIA EVENT (OUTPATIENT)
Dept: OBSTETRICS AND GYNECOLOGY | Facility: HOSPITAL | Age: 35
End: 2025-04-21
Payer: COMMERCIAL

## 2025-04-21 ENCOUNTER — APPOINTMENT (OUTPATIENT)
Dept: OBSTETRICS AND GYNECOLOGY | Facility: HOSPITAL | Age: 35
End: 2025-04-21
Payer: COMMERCIAL

## 2025-04-21 DIAGNOSIS — Z3A.40 40 WEEKS GESTATION OF PREGNANCY (HHS-HCC): ICD-10-CM

## 2025-04-21 DIAGNOSIS — R52 POSTPARTUM PAIN (HHS-HCC): Primary | ICD-10-CM

## 2025-04-21 LAB
ABO GROUP (TYPE) IN BLOOD: NORMAL
ANTIBODY SCREEN: NORMAL
ERYTHROCYTE [DISTWIDTH] IN BLOOD BY AUTOMATED COUNT: 13.2 % (ref 11.5–14.5)
HCT VFR BLD AUTO: 39.5 % (ref 36–46)
HGB BLD-MCNC: 13.2 G/DL (ref 12–16)
MCH RBC QN AUTO: 29.8 PG (ref 26–34)
MCHC RBC AUTO-ENTMCNC: 33.4 G/DL (ref 32–36)
MCV RBC AUTO: 89 FL (ref 80–100)
NRBC BLD-RTO: 0 /100 WBCS (ref 0–0)
PLATELET # BLD AUTO: 154 X10*3/UL (ref 150–450)
RBC # BLD AUTO: 4.43 X10*6/UL (ref 4–5.2)
RH FACTOR (ANTIGEN D): NORMAL
TREPONEMA PALLIDUM IGG+IGM AB [PRESENCE] IN SERUM OR PLASMA BY IMMUNOASSAY: NONREACTIVE
WBC # BLD AUTO: 9.4 X10*3/UL (ref 4.4–11.3)

## 2025-04-21 PROCEDURE — 7100000016 HC LABOR RECOVERY PER HOUR

## 2025-04-21 PROCEDURE — 7210000002 HC LABOR PER HOUR

## 2025-04-21 PROCEDURE — 86901 BLOOD TYPING SEROLOGIC RH(D): CPT | Performed by: OBSTETRICS & GYNECOLOGY

## 2025-04-21 PROCEDURE — 3700000014 EPIDURAL BLOCK: Performed by: NURSE ANESTHETIST, CERTIFIED REGISTERED

## 2025-04-21 PROCEDURE — 10907ZC DRAINAGE OF AMNIOTIC FLUID, THERAPEUTIC FROM PRODUCTS OF CONCEPTION, VIA NATURAL OR ARTIFICIAL OPENING: ICD-10-PCS | Performed by: OBSTETRICS & GYNECOLOGY

## 2025-04-21 PROCEDURE — 59409 OBSTETRICAL CARE: CPT | Performed by: OBSTETRICS & GYNECOLOGY

## 2025-04-21 PROCEDURE — 2500000004 HC RX 250 GENERAL PHARMACY W/ HCPCS (ALT 636 FOR OP/ED): Mod: JZ | Performed by: OBSTETRICS & GYNECOLOGY

## 2025-04-21 PROCEDURE — 1220000001 HC OB SEMI-PRIVATE ROOM DAILY

## 2025-04-21 PROCEDURE — 2500000001 HC RX 250 WO HCPCS SELF ADMINISTERED DRUGS (ALT 637 FOR MEDICARE OP): Performed by: OBSTETRICS & GYNECOLOGY

## 2025-04-21 PROCEDURE — 86780 TREPONEMA PALLIDUM: CPT | Mod: STJLAB | Performed by: OBSTETRICS & GYNECOLOGY

## 2025-04-21 PROCEDURE — 51702 INSERT TEMP BLADDER CATH: CPT

## 2025-04-21 PROCEDURE — 01967 NEURAXL LBR ANES VAG DLVR: CPT | Performed by: NURSE ANESTHETIST, CERTIFIED REGISTERED

## 2025-04-21 PROCEDURE — 85027 COMPLETE CBC AUTOMATED: CPT | Performed by: OBSTETRICS & GYNECOLOGY

## 2025-04-21 PROCEDURE — 0HQ9XZZ REPAIR PERINEUM SKIN, EXTERNAL APPROACH: ICD-10-PCS | Performed by: OBSTETRICS & GYNECOLOGY

## 2025-04-21 PROCEDURE — 2500000001 HC RX 250 WO HCPCS SELF ADMINISTERED DRUGS (ALT 637 FOR MEDICARE OP)

## 2025-04-21 PROCEDURE — 2500000004 HC RX 250 GENERAL PHARMACY W/ HCPCS (ALT 636 FOR OP/ED): Mod: JZ | Performed by: NURSE ANESTHETIST, CERTIFIED REGISTERED

## 2025-04-21 PROCEDURE — 59400 OBSTETRICAL CARE: CPT | Performed by: OBSTETRICS & GYNECOLOGY

## 2025-04-21 PROCEDURE — 3E033VJ INTRODUCTION OF OTHER HORMONE INTO PERIPHERAL VEIN, PERCUTANEOUS APPROACH: ICD-10-PCS | Performed by: OBSTETRICS & GYNECOLOGY

## 2025-04-21 PROCEDURE — 36415 COLL VENOUS BLD VENIPUNCTURE: CPT | Performed by: OBSTETRICS & GYNECOLOGY

## 2025-04-21 RX ORDER — OXYTOCIN/0.9 % SODIUM CHLORIDE 30/500 ML
60 PLASTIC BAG, INJECTION (ML) INTRAVENOUS ONCE AS NEEDED
Status: DISCONTINUED | OUTPATIENT
Start: 2025-04-21 | End: 2025-04-22 | Stop reason: HOSPADM

## 2025-04-21 RX ORDER — METHYLERGONOVINE MALEATE 0.2 MG/ML
0.2 INJECTION INTRAVENOUS ONCE AS NEEDED
Status: DISCONTINUED | OUTPATIENT
Start: 2025-04-21 | End: 2025-04-21

## 2025-04-21 RX ORDER — CARBOPROST TROMETHAMINE 250 UG/ML
250 INJECTION, SOLUTION INTRAMUSCULAR ONCE AS NEEDED
Status: DISCONTINUED | OUTPATIENT
Start: 2025-04-21 | End: 2025-04-22 | Stop reason: HOSPADM

## 2025-04-21 RX ORDER — LABETALOL HYDROCHLORIDE 5 MG/ML
20 INJECTION, SOLUTION INTRAVENOUS ONCE AS NEEDED
Status: DISCONTINUED | OUTPATIENT
Start: 2025-04-21 | End: 2025-04-22 | Stop reason: HOSPADM

## 2025-04-21 RX ORDER — OXYTOCIN 10 [USP'U]/ML
10 INJECTION, SOLUTION INTRAMUSCULAR; INTRAVENOUS ONCE AS NEEDED
Status: DISCONTINUED | OUTPATIENT
Start: 2025-04-21 | End: 2025-04-21

## 2025-04-21 RX ORDER — CARBOPROST TROMETHAMINE 250 UG/ML
250 INJECTION, SOLUTION INTRAMUSCULAR ONCE AS NEEDED
Status: DISCONTINUED | OUTPATIENT
Start: 2025-04-21 | End: 2025-04-21

## 2025-04-21 RX ORDER — MISOPROSTOL 200 UG/1
800 TABLET ORAL ONCE AS NEEDED
Status: DISCONTINUED | OUTPATIENT
Start: 2025-04-21 | End: 2025-04-22 | Stop reason: HOSPADM

## 2025-04-21 RX ORDER — ENOXAPARIN SODIUM 100 MG/ML
40 INJECTION SUBCUTANEOUS EVERY 24 HOURS
Status: DISCONTINUED | OUTPATIENT
Start: 2025-04-22 | End: 2025-04-22 | Stop reason: HOSPADM

## 2025-04-21 RX ORDER — LIDOCAINE HYDROCHLORIDE 10 MG/ML
30 INJECTION, SOLUTION INFILTRATION; PERINEURAL ONCE AS NEEDED
Status: DISCONTINUED | OUTPATIENT
Start: 2025-04-21 | End: 2025-04-21

## 2025-04-21 RX ORDER — DIPHENHYDRAMINE HCL 25 MG
25 TABLET ORAL EVERY 6 HOURS PRN
Status: DISCONTINUED | OUTPATIENT
Start: 2025-04-21 | End: 2025-04-22 | Stop reason: HOSPADM

## 2025-04-21 RX ORDER — TERBUTALINE SULFATE 1 MG/ML
0.25 INJECTION SUBCUTANEOUS ONCE AS NEEDED
Status: DISCONTINUED | OUTPATIENT
Start: 2025-04-21 | End: 2025-04-21

## 2025-04-21 RX ORDER — TRANEXAMIC ACID 100 MG/ML
1000 INJECTION, SOLUTION INTRAVENOUS ONCE AS NEEDED
Status: DISCONTINUED | OUTPATIENT
Start: 2025-04-21 | End: 2025-04-22 | Stop reason: HOSPADM

## 2025-04-21 RX ORDER — DIPHENHYDRAMINE HYDROCHLORIDE 50 MG/ML
25 INJECTION, SOLUTION INTRAMUSCULAR; INTRAVENOUS EVERY 6 HOURS PRN
Status: DISCONTINUED | OUTPATIENT
Start: 2025-04-21 | End: 2025-04-22 | Stop reason: HOSPADM

## 2025-04-21 RX ORDER — TRANEXAMIC ACID 100 MG/ML
1000 INJECTION, SOLUTION INTRAVENOUS ONCE AS NEEDED
Status: DISCONTINUED | OUTPATIENT
Start: 2025-04-21 | End: 2025-04-21

## 2025-04-21 RX ORDER — LOPERAMIDE HYDROCHLORIDE 2 MG/1
4 CAPSULE ORAL EVERY 2 HOUR PRN
Status: DISCONTINUED | OUTPATIENT
Start: 2025-04-21 | End: 2025-04-21

## 2025-04-21 RX ORDER — MINERAL OIL
OIL (ML) ORAL
Status: COMPLETED
Start: 2025-04-21 | End: 2025-04-21

## 2025-04-21 RX ORDER — MISOPROSTOL 200 UG/1
800 TABLET ORAL ONCE AS NEEDED
Status: DISCONTINUED | OUTPATIENT
Start: 2025-04-21 | End: 2025-04-21

## 2025-04-21 RX ORDER — OXYTOCIN/0.9 % SODIUM CHLORIDE 30/500 ML
60 PLASTIC BAG, INJECTION (ML) INTRAVENOUS
Status: DISCONTINUED | OUTPATIENT
Start: 2025-04-21 | End: 2025-04-21

## 2025-04-21 RX ORDER — ACETAMINOPHEN 325 MG/1
975 TABLET ORAL EVERY 6 HOURS
Status: DISCONTINUED | OUTPATIENT
Start: 2025-04-21 | End: 2025-04-22 | Stop reason: HOSPADM

## 2025-04-21 RX ORDER — FENTANYL/ROPIVACAINE/NS/PF 2MCG/ML-.2
0-25 PLASTIC BAG, INJECTION (ML) INJECTION CONTINUOUS
Refills: 0 | Status: DISCONTINUED | OUTPATIENT
Start: 2025-04-21 | End: 2025-04-21

## 2025-04-21 RX ORDER — NIFEDIPINE 10 MG/1
10 CAPSULE ORAL ONCE AS NEEDED
Status: DISCONTINUED | OUTPATIENT
Start: 2025-04-21 | End: 2025-04-21

## 2025-04-21 RX ORDER — SIMETHICONE 80 MG
80 TABLET,CHEWABLE ORAL 4 TIMES DAILY PRN
Status: DISCONTINUED | OUTPATIENT
Start: 2025-04-21 | End: 2025-04-22 | Stop reason: HOSPADM

## 2025-04-21 RX ORDER — HYDRALAZINE HYDROCHLORIDE 20 MG/ML
5 INJECTION INTRAMUSCULAR; INTRAVENOUS ONCE AS NEEDED
Status: DISCONTINUED | OUTPATIENT
Start: 2025-04-21 | End: 2025-04-21

## 2025-04-21 RX ORDER — HYDRALAZINE HYDROCHLORIDE 20 MG/ML
5 INJECTION INTRAMUSCULAR; INTRAVENOUS ONCE AS NEEDED
Status: DISCONTINUED | OUTPATIENT
Start: 2025-04-21 | End: 2025-04-22 | Stop reason: HOSPADM

## 2025-04-21 RX ORDER — ONDANSETRON 4 MG/1
4 TABLET, FILM COATED ORAL EVERY 6 HOURS PRN
Status: DISCONTINUED | OUTPATIENT
Start: 2025-04-21 | End: 2025-04-22 | Stop reason: HOSPADM

## 2025-04-21 RX ORDER — OXYTOCIN/0.9 % SODIUM CHLORIDE 30/500 ML
2-30 PLASTIC BAG, INJECTION (ML) INTRAVENOUS CONTINUOUS
Status: DISCONTINUED | OUTPATIENT
Start: 2025-04-21 | End: 2025-04-21

## 2025-04-21 RX ORDER — ONDANSETRON 4 MG/1
4 TABLET, FILM COATED ORAL EVERY 6 HOURS PRN
Status: DISCONTINUED | OUTPATIENT
Start: 2025-04-21 | End: 2025-04-21

## 2025-04-21 RX ORDER — METHYLERGONOVINE MALEATE 0.2 MG/ML
0.2 INJECTION INTRAVENOUS ONCE AS NEEDED
Status: DISCONTINUED | OUTPATIENT
Start: 2025-04-21 | End: 2025-04-22 | Stop reason: HOSPADM

## 2025-04-21 RX ORDER — ADHESIVE BANDAGE
10 BANDAGE TOPICAL
Status: DISCONTINUED | OUTPATIENT
Start: 2025-04-21 | End: 2025-04-22 | Stop reason: HOSPADM

## 2025-04-21 RX ORDER — ONDANSETRON HYDROCHLORIDE 2 MG/ML
4 INJECTION, SOLUTION INTRAVENOUS EVERY 6 HOURS PRN
Status: DISCONTINUED | OUTPATIENT
Start: 2025-04-21 | End: 2025-04-22 | Stop reason: HOSPADM

## 2025-04-21 RX ORDER — SODIUM CHLORIDE, SODIUM LACTATE, POTASSIUM CHLORIDE, CALCIUM CHLORIDE 600; 310; 30; 20 MG/100ML; MG/100ML; MG/100ML; MG/100ML
75 INJECTION, SOLUTION INTRAVENOUS CONTINUOUS
Status: DISCONTINUED | OUTPATIENT
Start: 2025-04-21 | End: 2025-04-21

## 2025-04-21 RX ORDER — LOPERAMIDE HYDROCHLORIDE 2 MG/1
4 CAPSULE ORAL EVERY 2 HOUR PRN
Status: DISCONTINUED | OUTPATIENT
Start: 2025-04-21 | End: 2025-04-22 | Stop reason: HOSPADM

## 2025-04-21 RX ORDER — ONDANSETRON HYDROCHLORIDE 2 MG/ML
4 INJECTION, SOLUTION INTRAVENOUS EVERY 6 HOURS PRN
Status: DISCONTINUED | OUTPATIENT
Start: 2025-04-21 | End: 2025-04-21

## 2025-04-21 RX ORDER — IBUPROFEN 600 MG/1
600 TABLET ORAL EVERY 6 HOURS
Status: DISCONTINUED | OUTPATIENT
Start: 2025-04-21 | End: 2025-04-22 | Stop reason: HOSPADM

## 2025-04-21 RX ORDER — POLYETHYLENE GLYCOL 3350 17 G/17G
17 POWDER, FOR SOLUTION ORAL 2 TIMES DAILY PRN
Status: DISCONTINUED | OUTPATIENT
Start: 2025-04-21 | End: 2025-04-22 | Stop reason: HOSPADM

## 2025-04-21 RX ORDER — OXYTOCIN 10 [USP'U]/ML
10 INJECTION, SOLUTION INTRAMUSCULAR; INTRAVENOUS ONCE AS NEEDED
Status: DISCONTINUED | OUTPATIENT
Start: 2025-04-21 | End: 2025-04-22 | Stop reason: HOSPADM

## 2025-04-21 RX ORDER — LABETALOL HYDROCHLORIDE 5 MG/ML
20 INJECTION, SOLUTION INTRAVENOUS ONCE AS NEEDED
Status: DISCONTINUED | OUTPATIENT
Start: 2025-04-21 | End: 2025-04-21

## 2025-04-21 RX ADMIN — MINERAL OIL: 1000 SOLUTION ORAL at 15:10

## 2025-04-21 RX ADMIN — ACETAMINOPHEN 975 MG: 325 TABLET, FILM COATED ORAL at 16:50

## 2025-04-21 RX ADMIN — ONDANSETRON 4 MG: 2 INJECTION INTRAMUSCULAR; INTRAVENOUS at 12:06

## 2025-04-21 RX ADMIN — IBUPROFEN 600 MG: 600 TABLET, FILM COATED ORAL at 22:57

## 2025-04-21 RX ADMIN — SODIUM CHLORIDE, SODIUM LACTATE, POTASSIUM CHLORIDE, AND CALCIUM CHLORIDE 75 ML/HR: .6; .31; .03; .02 INJECTION, SOLUTION INTRAVENOUS at 14:36

## 2025-04-21 RX ADMIN — Medication 2 MILLI-UNITS/MIN: at 08:42

## 2025-04-21 RX ADMIN — SODIUM CHLORIDE, SODIUM LACTATE, POTASSIUM CHLORIDE, AND CALCIUM CHLORIDE 75 ML/HR: .6; .31; .03; .02 INJECTION, SOLUTION INTRAVENOUS at 08:40

## 2025-04-21 RX ADMIN — ACETAMINOPHEN 975 MG: 325 TABLET, FILM COATED ORAL at 22:57

## 2025-04-21 RX ADMIN — IBUPROFEN 600 MG: 600 TABLET, FILM COATED ORAL at 16:50

## 2025-04-21 RX ADMIN — SODIUM CHLORIDE, SODIUM LACTATE, POTASSIUM CHLORIDE, AND CALCIUM CHLORIDE 1000 ML: .6; .31; .03; .02 INJECTION, SOLUTION INTRAVENOUS at 12:06

## 2025-04-21 RX ADMIN — Medication 8 ML/HR: at 12:23

## 2025-04-21 SDOH — SOCIAL STABILITY: SOCIAL INSECURITY: HAS ANYONE EVER THREATENED TO HURT YOUR FAMILY OR YOUR PETS?: NO

## 2025-04-21 SDOH — SOCIAL STABILITY: SOCIAL INSECURITY
WITHIN THE LAST YEAR, HAVE YOU BEEN KICKED, HIT, SLAPPED, OR OTHERWISE PHYSICALLY HURT BY YOUR PARTNER OR EX-PARTNER?: NO

## 2025-04-21 SDOH — HEALTH STABILITY: MENTAL HEALTH: HAVE YOU USED ANY PRESCRIPTION DRUGS OTHER THAN PRESCRIBED IN THE PAST 12 MONTHS?: NO

## 2025-04-21 SDOH — HEALTH STABILITY: MENTAL HEALTH: WISH TO BE DEAD (PAST 1 MONTH): NO

## 2025-04-21 SDOH — ECONOMIC STABILITY: TRANSPORTATION INSECURITY: IN THE PAST 12 MONTHS, HAS LACK OF TRANSPORTATION KEPT YOU FROM MEDICAL APPOINTMENTS OR FROM GETTING MEDICATIONS?: NO

## 2025-04-21 SDOH — SOCIAL STABILITY: SOCIAL INSECURITY: DO YOU FEEL ANYONE HAS EXPLOITED OR TAKEN ADVANTAGE OF YOU FINANCIALLY OR OF YOUR PERSONAL PROPERTY?: NO

## 2025-04-21 SDOH — SOCIAL STABILITY: SOCIAL INSECURITY
WITHIN THE LAST YEAR, HAVE YOU BEEN RAPED OR FORCED TO HAVE ANY KIND OF SEXUAL ACTIVITY BY YOUR PARTNER OR EX-PARTNER?: NO

## 2025-04-21 SDOH — ECONOMIC STABILITY: FOOD INSECURITY: WITHIN THE PAST 12 MONTHS, YOU WORRIED THAT YOUR FOOD WOULD RUN OUT BEFORE YOU GOT THE MONEY TO BUY MORE.: NEVER TRUE

## 2025-04-21 SDOH — SOCIAL STABILITY: SOCIAL INSECURITY: VERBAL ABUSE: DENIES

## 2025-04-21 SDOH — SOCIAL STABILITY: SOCIAL INSECURITY: WITHIN THE LAST YEAR, HAVE YOU BEEN HUMILIATED OR EMOTIONALLY ABUSED IN OTHER WAYS BY YOUR PARTNER OR EX-PARTNER?: NO

## 2025-04-21 SDOH — ECONOMIC STABILITY: TRANSPORTATION INSECURITY

## 2025-04-21 SDOH — ECONOMIC STABILITY: FOOD INSECURITY: WITHIN THE PAST 12 MONTHS, THE FOOD YOU BOUGHT JUST DIDN'T LAST AND YOU DIDN'T HAVE MONEY TO GET MORE.: NEVER TRUE

## 2025-04-21 SDOH — ECONOMIC STABILITY: FOOD INSECURITY: WITHIN THE PAST 12 MONTHS, YOU WORRIED THAT YOUR FOOD WOULD RUN OUT BEFORE YOU GOT MONEY TO BUY MORE.: NEVER TRUE

## 2025-04-21 SDOH — SOCIAL STABILITY: SOCIAL INSECURITY: WITHIN THE LAST YEAR, HAVE YOU BEEN AFRAID OF YOUR PARTNER OR EX-PARTNER?: NO

## 2025-04-21 SDOH — HEALTH STABILITY: MENTAL HEALTH: SUICIDAL BEHAVIOR (LIFETIME): NO

## 2025-04-21 SDOH — SOCIAL STABILITY: SOCIAL INSECURITY: HAVE YOU HAD ANY THOUGHTS OF HARMING ANYONE ELSE?: NO

## 2025-04-21 SDOH — HEALTH STABILITY: MENTAL HEALTH: CURRENT SMOKER: 0

## 2025-04-21 SDOH — HEALTH STABILITY: MENTAL HEALTH: NON-SPECIFIC ACTIVE SUICIDAL THOUGHTS (PAST 1 MONTH): NO

## 2025-04-21 SDOH — SOCIAL STABILITY: SOCIAL INSECURITY: DOES ANYONE TRY TO KEEP YOU FROM HAVING/CONTACTING OTHER FRIENDS OR DOING THINGS OUTSIDE YOUR HOME?: NO

## 2025-04-21 SDOH — SOCIAL STABILITY: SOCIAL INSECURITY: ARE YOU OR HAVE YOU BEEN THREATENED OR ABUSED PHYSICALLY, EMOTIONALLY, OR SEXUALLY BY ANYONE?: NO

## 2025-04-21 SDOH — ECONOMIC STABILITY: FOOD INSECURITY

## 2025-04-21 SDOH — ECONOMIC STABILITY: TRANSPORTATION INSECURITY
IN THE PAST 12 MONTHS, HAS THE LACK OF TRANSPORTATION KEPT YOU FROM MEDICAL APPOINTMENTS OR FROM GETTING MEDICATIONS?: NO

## 2025-04-21 SDOH — ECONOMIC STABILITY: TRANSPORTATION INSECURITY
IN THE PAST 12 MONTHS, HAS LACK OF TRANSPORTATION KEPT YOU FROM MEETINGS, WORK, OR FROM GETTING THINGS NEEDED FOR DAILY LIVING?: NO

## 2025-04-21 SDOH — HEALTH STABILITY: MENTAL HEALTH: HAVE YOU USED ANY SUBSTANCES (CANABIS, COCAINE, HEROIN, HALLUCINOGENS, INHALANTS, ETC.) IN THE PAST 12 MONTHS?: NO

## 2025-04-21 SDOH — HEALTH STABILITY: MENTAL HEALTH: WERE YOU ABLE TO COMPLETE ALL THE BEHAVIORAL HEALTH SCREENINGS?: YES

## 2025-04-21 SDOH — SOCIAL STABILITY: SOCIAL INSECURITY: HAVE YOU HAD THOUGHTS OF HARMING ANYONE ELSE?: NO

## 2025-04-21 SDOH — SOCIAL STABILITY: SOCIAL INSECURITY: ARE THERE ANY APPARENT SIGNS OF INJURIES/BEHAVIORS THAT COULD BE RELATED TO ABUSE/NEGLECT?: NO

## 2025-04-21 SDOH — SOCIAL STABILITY: SOCIAL INSECURITY: PHYSICAL ABUSE: DENIES

## 2025-04-21 SDOH — SOCIAL STABILITY: SOCIAL INSECURITY: ABUSE SCREEN: ADULT

## 2025-04-21 SDOH — ECONOMIC STABILITY: HOUSING INSECURITY: DO YOU FEEL UNSAFE GOING BACK TO THE PLACE WHERE YOU ARE LIVING?: NO

## 2025-04-21 ASSESSMENT — PATIENT HEALTH QUESTIONNAIRE - PHQ9
2. FEELING DOWN, DEPRESSED OR HOPELESS: NOT AT ALL
1. LITTLE INTEREST OR PLEASURE IN DOING THINGS: NOT AT ALL
SUM OF ALL RESPONSES TO PHQ9 QUESTIONS 1 & 2: 0

## 2025-04-21 ASSESSMENT — PAIN DESCRIPTION - LOCATION: LOCATION: HEAD

## 2025-04-21 ASSESSMENT — ACTIVITIES OF DAILY LIVING (ADL)
ADEQUATE_TO_COMPLETE_ADL: YES
PATIENT'S MEMORY ADEQUATE TO SAFELY COMPLETE DAILY ACTIVITIES?: YES
LACK_OF_TRANSPORTATION: NO
LACK_OF_TRANSPORTATION: NO
GROOMING: INDEPENDENT
TOILETING: INDEPENDENT
JUDGMENT_ADEQUATE_SAFELY_COMPLETE_DAILY_ACTIVITIES: YES
HEARING - RIGHT EAR: FUNCTIONAL
BATHING: INDEPENDENT
WALKS IN HOME: INDEPENDENT
HEARING - LEFT EAR: FUNCTIONAL
DRESSING YOURSELF: INDEPENDENT

## 2025-04-21 ASSESSMENT — LIFESTYLE VARIABLES
HOW OFTEN DO YOU HAVE A DRINK CONTAINING ALCOHOL: NEVER
HOW MANY STANDARD DRINKS CONTAINING ALCOHOL DO YOU HAVE ON A TYPICAL DAY: PATIENT DOES NOT DRINK
AUDIT-C TOTAL SCORE: 0
AUDIT-C TOTAL SCORE: 0
SKIP TO QUESTIONS 9-10: 1
HOW OFTEN DO YOU HAVE 6 OR MORE DRINKS ON ONE OCCASION: NEVER

## 2025-04-21 ASSESSMENT — PAIN SCALES - GENERAL
PAINLEVEL_OUTOF10: 0 - NO PAIN
PAINLEVEL_OUTOF10: 1
PAIN_LEVEL: 1
PAINLEVEL_OUTOF10: 0 - NO PAIN

## 2025-04-21 NOTE — ANESTHESIA PREPROCEDURE EVALUATION
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   Obstetric History                Physical Exam    Airway  Mallampati: III  TM distance: >3 FB  Mouth opening: 3 or more finger widths     Cardiovascular    Dental    Pulmonary    Abdominal            Anesthesia Plan    History of general anesthesia?: yes  History of complications of general anesthesia?: no    ASA 2     epidural   (I informed and discussed the risks and benefits of general, spinal and epidural anesthesia with the patient.  The patient expressed her understanding and her questions were answered.  A verbal consent was given by the patient.  )  The patient is not a current smoker.    Anesthetic plan and risks discussed with patient.  Use of blood products discussed with patient who consented to blood products.

## 2025-04-21 NOTE — PROGRESS NOTES
Pt comfortable with epidural.  FHR: 135, cat 1, mod camille, +accels  TOCOS: q2-3min  Cervix: 4/80/-2  Pitocin: 8    Visit Vitals  /57   Pulse 71   Temp 36.6 °C (97.9 °F) (Oral)   Resp 18        Plan: AROM with clear fluid, pitocin at 8, cat 1 FHR,  will closely monitor.

## 2025-04-21 NOTE — L&D DELIVERY NOTE
OB Delivery Note  2025  Lori Fry  34 y.o.   Vaginal, Spontaneous       Gestational Age: 40w2d  /Para:   Quantitative Blood Loss: Admission to Discharge: 237 mL (2025  7:21 AM - 2025  8:20 AM)    Ja Fry [[id number]]      Labor Events    Sac identifier: Sac 1  Rupture date/time: 2025 1247  Rupture type: Artificial  Fluid color: Clear  Fluid odor: None  Labor type: Induced Onset of Labor  Labor allowed to proceed with plans for an attempted vaginal birth?: Yes  Induction: Oxytocin  Induction indications: Post-term Gestation  Complications: None       Labor Event Times    Labor onset date/time: 2025 0721  Dilation complete date/time: 2025 1455  Start pushing date/time: 2025 14:55       Labor Length    1st stage: 7h 34m  2nd stage: 0h 58m  3rd stage: 0h 04m       Placenta    Placenta delivery date/time: 2025 15:57  Placenta removal: Spontaneous  Placenta appearance: Intact  Placenta disposition: discarded       Cord    Vessels: 3 vessels  Complications: None  Delayed cord clamping?: Yes  Cord clamped date/time: 2025 15:55:00  Cord blood disposition: Lab  Gases sent?: No  Stem cell collection (by provider): No       Lacerations    Episiotomy: None  Perineal laceration: 1st  Other lacerations?: No  Repair suture: 3-0 Synthetic Suture       Anesthesia    Method: Epidural       Operative Delivery    Forceps attempted?: No  Vacuum extractor attempted?: No       Shoulder Dystocia    Shoulder dystocia present?: No       [place] Delivery    Time head delivered: 2025 15:53:00  Birth date/time: 2025 15:53:00  Delivery type: Vaginal, Spontaneous  Complications: None       Resuscitation    Method: Suctioning, Tactile stimulation       Apgars    Living status: Living  Apgar Component Scores:  1 min.:  5 min.:  10 min.:  15 min.:  20 min.:    Skin color:  0  1       Heart rate:  2  2       Reflex irritability:  2  2       Muscle tone:  1  2        Respiratory effort:  2  2       Total:  7  9       Apgars assigned by: GERA WALLER RN       Delivery Providers    Delivering clinician: Jaquelin Medrano DO   Provider Role    Nevaeh Maciel RN Delivery Nurse    Miley Waller RN Nursery Nurse                 Pt induced with pitocin, progressed to complete and delivered a baby girl.  Had a small 1st degree tear that was repaired with 3-0 vicryl.  The placenta delivered spontaneously with out problems.     Jaquelin Medrano DO

## 2025-04-21 NOTE — PROGRESS NOTES
Pt feeling some pressure with ctxs.  FHR: 130, cat 2, occasional variable decel, early decels, mod camille, +accels  TOCOS: q2-3min  Cervix: 6/90/-1  Pitocin: turned off    Visit Vitals  BP 97/58   Pulse 79   Temp 36.7 °C (98.1 °F) (Oral)   Resp 18        Plan: had 2 minute variable down to 90 and recovered with turning off pitocin and changing positions.  Continue to closely monitor.

## 2025-04-21 NOTE — H&P
Obstetrical Admission History and Physical     Lori Fry is a 34 y.o.  at 40w2d. DOUG: 2025, by Last Menstrual Period. Estimated fetal weight: 7lbs 10oz. She has had prenatal care with Dr Mabry .    Chief Complaint: Scheduled Induction    Assessment/Plan    VSS  GBS neg  Signed L&D consent and answered all questions  FHR: cat 1  Pitocin induction  Assessment & Plan  40 weeks gestation of pregnancy ([facility])        Pregnancy Problems (from 24 to present)       Problem Noted Diagnosed Resolved    40 weeks gestation of pregnancy ([facility]) 2025 by Jaquelin Medrano, DO  No    Priority:  Medium       39 weeks gestation of pregnancy ([facility]) 2024 by Beba Mabry MD  No    Priority:  Medium       Overview Addendum 3/31/2025 12:50 PM by Beba Mabry MD   Desired provider in labor: [] CNM  [x] Physician  [x] Blood Products: [x] Yes, accepts [] No, needs counseling  [x] Initial BMI: 29.07   [x] Prenatal Labs: rubella equiv, MMR postpartum  [x] Cervical Cancer Screening up to date  [x] Rh status:  rh+  [x] Genetic Screening: NIPS risk reducing  [x] NT US: (11-13 wks) normal  [x] Baby ASA (if indicated): not indicated  [x] Pregnancy dated by: LMP c/w FTUS    [x] Anatomy US: (19-20 wks)  [] Federal Sterilization consent signed (if indicated):  [x] 1hr GCT at 24-28wks: wnl  [] Fetal Surveillance (if indicated):  [x] Tdap (27-32 wks, may be given up to 36 wks if initial window missed):  given 2/10/25  [] RSV (32-36 wks) (Sept. to end ):   [x] Flu Vaccine: given 10/15  COVID booster recommended    [] Breastfeeding:  [] Postpartum Birth control method:   [x] GBS at 36 - 37 wks: neg  [x] 39 weeks discussion of IOL vs. Expectant management: IOL  at 8 am  [x] Mode of delivery ( anticipated ): vaginal               Options for delivery have been discussed with the patient and she elects for an induction of labor.  Cervical ripening with cytotec, cervidil, other prostaglandin agents has been  discussed.  Induction of labor with pitocin, amniotomy, cytotec, and cervical balloon have been discussed in detail. The risks, benefits, complications, alternatives, expected outcomes, potential problems during recuperation and recovery, and the risks of not performing the procedure were discussed with the patient. The patient stated understanding that the risks of delivery include, but are not limited to: death; reaction to medications; injury to bowel, bladder, ureters, uterus, cervix, vagina, and other pelvic and abdominal structures, infection; blood loss and possible need for transfusion; and potential need for surgery, including hysterectomy. The risks of injury to the infant during delivery were also discussed. All questions were answered. There was concurrence with the planned procedure, and the patient wanted to proceed.    Admit to inpatient status. I anticipate that this patient will require a stay exceeding at least 2 midnights for delivery and postpartum.  Induction of labor.  Management of pregnancy complications, as indicated.    Subjective   Good fetal movement. Denies vaginal bleeding., C/O of occasional contractions., Denies leaking of fluid.       Reason for Induction of Labor:  Pregnancy at 39 weeks or greater for induction    Pt presents for induction, no problems with pregnancy, had some ctxs on and off the last few days.      Obstetrical History   OB History    Para Term  AB Living   2 1 1   1   SAB IAB Ectopic Multiple Live Births       1      # Outcome Date GA Lbr Jose/2nd Weight Sex Type Anes PTL Lv   2 Current            1 Term 22 40w0d   F Vag-Spont EPI  SIDNEY       Past Medical History  Medical History[1]     Past Surgical History   Surgical History[2]    Social History  Social History     Tobacco Use    Smoking status: Never    Smokeless tobacco: Never   Substance Use Topics    Alcohol use: Not Currently     Comment: Socially     Substance and Sexual Activity   Drug  Use Never       Allergies  Patient has no known allergies.     Medications  Prescriptions Prior to Admission[3]    Objective    Last Vitals  Temp Pulse Resp BP MAP O2 Sat   36.6 °C (97.9 °F) 87 18 119/76 91 96 %     Physical Examination  GENERAL: Examination reveals a well developed, well nourished, gravid female in no acute distress. She is alert and cooperative.  FHR is 140 , with +Accelerations, and a cat 1  tracing.    Henriette reading: q5-7min   The fetus is in a vertex presentation, determined by vaginal exam  CERVIX: 3 cm dilated, 50 % effaced, -2 station; MEMBRANES are Intact  SKIN: normal coloration and turgor, no rashes  NEUROLOGICAL: DTRs normal and symmetrical  PSYCHOLOGICAL: awake and alert; oriented to person, place, and time    Lab Review  Lab Results   Component Value Date    WBC 9.4 04/21/2025    HGB 13.2 04/21/2025    HCT 39.5 04/21/2025     04/21/2025            [1]   Past Medical History:  Diagnosis Date    Allergy status to unspecified drugs, medicaments and biological substances     History of seasonal allergies    Anxiety disorder, unspecified     Anxiety    DLBCL (diffuse large B cell lymphoma) 05/26/2023    In the chest; had chemo and it made it shrink and the mass is gone.  Sees Oncology once a year    Pneumonia 05/26/2023    Seasonal allergies    [2]   Past Surgical History:  Procedure Laterality Date    IR LUMBAR PUNCTURE      LYMPH NODE BIOPSY      chest - diagnosed with BCell NHL    WISDOM TOOTH EXTRACTION     [3]   Medications Prior to Admission   Medication Sig Dispense Refill Last Dose/Taking    famotidine (Pepcid) 20 mg tablet Take 1 tablet (20 mg) by mouth 2 times a day. 90 tablet 3 4/20/2025 Evening    prenatal vit no.124/iron/folic (PRENATAL VITAMIN ORAL) Take 1 tablet by mouth once daily.   4/21/2025 Morning    escitalopram (Lexapro) 10 mg tablet TAKE 1 TABLET BY MOUTH EVERY DAY (Patient not taking: Reported on 12/13/2024) 90 tablet 0 More than a month    lisdexamfetamine  (Vyvanse) 30 mg capsule Take 1 capsule (30 mg) by mouth once daily in the morning. Do not fill before October 21, 2024. 30 capsule 0     lisdexamfetamine (Vyvanse) 30 mg capsule Take 1 capsule (30 mg) by mouth once daily in the morning. Do not fill before October 12, 2024. 30 capsule 0     lisdexamfetamine (Vyvanse) 30 mg capsule Take 1 capsule (30 mg) by mouth once daily in the morning. Do not fill before November 20, 2024. (Patient not taking: Reported on 12/13/2024) 30 capsule 0     lisdexamfetamine (Vyvanse) 30 mg capsule Take 1 capsule (30 mg) by mouth once daily in the morning. Do not fill before December 20, 2024. (Patient not taking: Reported on 12/13/2024 Do not fill before December 20, 2024.) 30 capsule 0     ondansetron (Zofran) 4 mg tablet Take 1 tablet (4 mg) by mouth every 6 hours as needed for nausea/vomiting (Patient not taking: Reported on 12/13/2024) 20 tablet 1 More than a month

## 2025-04-21 NOTE — ANESTHESIA PROCEDURE NOTES
Epidural Block    Patient location during procedure: OB  Start time: 4/21/2025 12:11 PM  End time: 4/21/2025 12:23 PM  Reason for block: labor analgesia  Staffing  Performed: CRNA   Authorized by: GRAEME Langford    Performed by: GRAEME Langford    Preanesthetic Checklist  Completed: patient identified, IV checked, risks and benefits discussed, surgical consent, pre-op evaluation, timeout performed and sterile techniques followed  Block Timeout  RN/Licensed healthcare professional reads aloud to the Anesthesia provider and entire team: Patient identity, procedure with side and site, patient position, and as applicable the availability of implants/special equipment/special requirements.  Patient on coagulant treatment: no  Timeout performed at: 4/21/2025 12:11 PM  Block Placement  Patient position: sitting  Prep: ChloraPrep  Sterility prep: mask, hand, gloves, drape and cap  Sedation level: no sedation  Patient monitoring: heart rate, continuous pulse oximetry and blood pressure  Approach: midline  Local numbing: lidocaine 1% to skin and subcutaneous tissues  Vertebral space: lumbar  Lumbar location: L2-L3  Epidural  Loss of resistance technique: saline  Guidance: landmark technique        Needle  Needle type: Tuohy   Needle gauge: 17  Needle length: 8.9cm  Needle insertion depth: 7 cm  Catheter type: multi-orifice  Catheter size: 20 G  Catheter at skin depth: 12 cm  Catheter securement method: clear occlusive dressing and surgical tape    Test dose: lidocaine 1.5% with epinephrine 1-to-200,000  Test dose: lidocaine 1.5% with epinephrine 1-to-200,000  Test dose result: no positive test dose    PCEA  Medication concentration used: 0.2% Ropivacaine with 2 mcg/mL Fentanyl  Dose (mL): 5  Lockout (minutes): 20  1-Hour Limit (boluses/hr): 3  Basal Rate: 8        Assessment  Sensory level: T6 bilateral  Block outcome: patient comfortable  Number of attempts: 1  Events: no positive test  dose  Procedure assessment: patient tolerated procedure well with no immediate complications

## 2025-04-22 VITALS
BODY MASS INDEX: 36.63 KG/M2 | HEIGHT: 62 IN | RESPIRATION RATE: 16 BRPM | SYSTOLIC BLOOD PRESSURE: 108 MMHG | HEART RATE: 67 BPM | TEMPERATURE: 97.5 F | WEIGHT: 199.08 LBS | DIASTOLIC BLOOD PRESSURE: 73 MMHG | OXYGEN SATURATION: 98 %

## 2025-04-22 PROCEDURE — 2500000004 HC RX 250 GENERAL PHARMACY W/ HCPCS (ALT 636 FOR OP/ED): Mod: JZ | Performed by: OBSTETRICS & GYNECOLOGY

## 2025-04-22 PROCEDURE — 2500000001 HC RX 250 WO HCPCS SELF ADMINISTERED DRUGS (ALT 637 FOR MEDICARE OP): Performed by: OBSTETRICS & GYNECOLOGY

## 2025-04-22 PROCEDURE — 90707 MMR VACCINE SC: CPT | Mod: JZ | Performed by: OBSTETRICS & GYNECOLOGY

## 2025-04-22 PROCEDURE — 90471 IMMUNIZATION ADMIN: CPT | Performed by: OBSTETRICS & GYNECOLOGY

## 2025-04-22 RX ORDER — ACETAMINOPHEN 500 MG
1000 TABLET ORAL EVERY 6 HOURS PRN
COMMUNITY
Start: 2025-04-22

## 2025-04-22 RX ORDER — IBUPROFEN 200 MG
600 TABLET ORAL EVERY 6 HOURS PRN
COMMUNITY
Start: 2025-04-22

## 2025-04-22 RX ADMIN — IBUPROFEN 600 MG: 600 TABLET, FILM COATED ORAL at 16:57

## 2025-04-22 RX ADMIN — MEASLES, MUMPS, AND RUBELLA VIRUS VACCINE LIVE 0.5 ML: 1000; 12500; 1000 INJECTION, POWDER, LYOPHILIZED, FOR SUSPENSION SUBCUTANEOUS at 16:57

## 2025-04-22 RX ADMIN — ENOXAPARIN SODIUM 40 MG: 40 INJECTION SUBCUTANEOUS at 10:57

## 2025-04-22 RX ADMIN — IBUPROFEN 600 MG: 600 TABLET, FILM COATED ORAL at 10:55

## 2025-04-22 RX ADMIN — ACETAMINOPHEN 975 MG: 325 TABLET, FILM COATED ORAL at 04:55

## 2025-04-22 RX ADMIN — ACETAMINOPHEN 975 MG: 325 TABLET, FILM COATED ORAL at 10:56

## 2025-04-22 RX ADMIN — ACETAMINOPHEN 975 MG: 325 TABLET, FILM COATED ORAL at 16:56

## 2025-04-22 RX ADMIN — IBUPROFEN 600 MG: 600 TABLET, FILM COATED ORAL at 04:55

## 2025-04-22 ASSESSMENT — PAIN SCALES - GENERAL
PAINLEVEL_OUTOF10: 3
PAINLEVEL_OUTOF10: 2
PAINLEVEL_OUTOF10: 0 - NO PAIN
PAINLEVEL_OUTOF10: 4

## 2025-04-22 NOTE — ANESTHESIA POSTPROCEDURE EVALUATION
Patient: Lori Fry    Procedure Summary       Date: 04/21/25 Room / Location:     Anesthesia Start: 1211 Anesthesia Stop: 1553    Procedure: Labor Analgesia Diagnosis:     Scheduled Providers:  Responsible Provider: GRAEME Langford    Anesthesia Type: epidural ASA Status: 2            Anesthesia Type: epidural    Vitals Value Taken Time   /67 04/21/25 22:59   Temp 36.6 °C (97.8 °F) 04/21/25 22:59   Pulse 80 04/21/25 22:59   Resp 16 04/21/25 22:59   SpO2 96 % 04/21/25 22:59       Anesthesia Post Evaluation    Patient location during evaluation: bedside  Patient participation: complete - patient participated  Level of consciousness: awake and alert  Pain score: 1  Pain management: adequate  Multimodal analgesia pain management approach  Airway patency: patent  Two or more strategies used to mitigate risk of obstructive sleep apnea  Cardiovascular status: acceptable  Respiratory status: acceptable  Hydration status: acceptable  Postoperative Nausea and Vomiting: none        No notable events documented.

## 2025-04-22 NOTE — DISCHARGE SUMMARY
Discharge Summary    Admission Date: 4/21/2025  Discharge Date: 4/22/2025     Discharge Diagnosis  40 weeks gestation of pregnancy ([facility])    Hospital Course  Delivery Date: 4/21/2025 3:53 PM  Delivery type: Vaginal, Spontaneous   GA at delivery: 40w2d  Outcome: Living  Anesthesia during delivery: Epidural  Intrapartum complications: None  Feeding method: Breastfeeding Status: Yes     Procedures:  Vaginal delivery  Contraception at discharge: none      Pertinent Physical Exam At Time of Discharge  General: Examination reveals a well developed, well nourished, female, in no acute distress. She is alert and cooperative.  Fundus: firm and below umbilicus.  Extremities: no redness or tenderness in the calves or thighs, no edema.  Psychological: awake and alert; oriented to person, place, and time.    Discharge Meds     Your medication list        START taking these medications        Instructions Last Dose Given Next Dose Due   acetaminophen 500 mg tablet  Commonly known as: Tylenol Extra Strength      Take 2 tablets (1,000 mg) by mouth every 6 hours if needed for mild pain (1 - 3).       ibuprofen 200 mg tablet  Commonly known as: Motrin IB      Take 3 tablets (600 mg) by mouth every 6 hours if needed for mild pain (1 - 3).              CHANGE how you take these medications        Instructions Last Dose Given Next Dose Due   lisdexamfetamine 30 mg capsule  Commonly known as: Vyvanse  What changed: Another medication with the same name was removed. Continue taking this medication, and follow the directions you see here.      Take 1 capsule (30 mg) by mouth once daily in the morning. Do not fill before October 12, 2024.       lisdexamfetamine 30 mg capsule  Commonly known as: Vyvanse  What changed: Another medication with the same name was removed. Continue taking this medication, and follow the directions you see here.      Take 1 capsule (30 mg) by mouth once daily in the morning. Do not fill before October 21,  2024.              CONTINUE taking these medications        Instructions Last Dose Given Next Dose Due   PRENATAL VITAMIN ORAL                  STOP taking these medications      escitalopram 10 mg tablet  Commonly known as: Lexapro        famotidine 20 mg tablet  Commonly known as: Pepcid        ondansetron 4 mg tablet  Commonly known as: Zofran                  Where to Get Your Medications        You can get these medications from any pharmacy    You don't need a prescription for these medications  acetaminophen 500 mg tablet  ibuprofen 200 mg tablet          Complications Requiring Follow-Up  None    Test Results Pending At Discharge  Pending Labs       No current pending labs.            Outpatient Follow-Up  Future Appointments   Date Time Provider Department Center   2/20/2026  2:00 PM Mann Martinez MD [id number]       I spent 10 minutes in the professional and overall care of this patient.      Zaki Oconnor MD

## 2025-04-22 NOTE — CARE PLAN
The patient's goals for the shift include to be discharged home    The clinical goals for the shift include to maintain stable vital sings    Over the shift, the patient did make progress toward the following goals and is adequate for discharge.

## 2025-04-22 NOTE — CARE PLAN
The patient's goals for the shift include bond with baby    The clinical goals for the shift include return to pre-pregnancy state    Problem: Postpartum  Goal: Experiences normal postpartum course  Outcome: Progressing  Flowsheets (Taken 2025)  Experiences normal postpartum course:   Monitor maternal vital signs   Assess uterine involution  Goal: Appropriate maternal -  bonding  Outcome: Progressing  Flowsheets (Taken 2025)  Appropriate maternal- bondin-hour rooming in   Identify family support  Goal: Establish and maintain infant feeding pattern for adequate nutrition  Outcome: Progressing  Flowsheets (Taken 2025)  Establish and maintain infant feeding pattern for adequate nutrition:   Assess  human milk feeding   Encourage feeding and/or pumping at least 8x/day  Goal: Incisions, wounds, or drain sites healing without S/S of infection  Outcome: Progressing  Flowsheets (Taken 2025)  Incisions, wounds, or drain sites healing without sign and symptoms of infection: ADMISSION and DAILY: Assess and document risk factors for pressure ulcer development  Goal: No s/sx infection  Outcome: Progressing  Flowsheets (Taken 2025)  No s/sx of infection: Hygiene practices/daylin-care  Goal: No s/sx of hemorrhage  Outcome: Progressing  Flowsheets (Taken 2025)  No s/sx of hemorrhage: Monitor QBL and vital signs  Goal: Minimal s/sx of HDP and BP<160/110  Outcome: Progressing  Flowsheets (Taken 2025)  Minimal s/sx of HDP and BP <160/110: Med administration/monitoring of effect

## 2025-04-22 NOTE — LACTATION NOTE
Lactation Consultant Note  Lactation Consultation  Reason for Consult: Follow-up assessment  Consultant Name: Peyton Cordero RN IBCLC    Maternal Information  Has mother  before?: Yes  How long did the mother previously breastfeed?: 6months at least  Infant to breast within first 2 hours of birth?: Yes  Exclusive Pump and Bottle Feed: No    Maternal Assessment  Breast Assessment: Medium, Symmetrical, Pendulous, Soft, Compressible  Nipple Assessment: Intact, Erect, Large diameter, Creased after feeding, Sore, Red/inflamed  Alterations in Nipple Condition: Stage I - pain or irritation with no skin break down  Areola Assessment: Normal    Infant Assessment  Infant Behavior: Awake, Active alert, Feeding cues observed, Sucking  Infant Assessment: Receding chin    Feeding Assessment  Nutrition Source: Breastmilk  Feeding Method: Nursing at the breast  Feeding Position: Football/seated  Suck/Feeding: Sustained, Baby led rhythmically, Content after feeding  Latch Assessment: Moderate assistance is needed, Instructed on deep latch, Eagerly grasped on to latch, Latch achieved, Pain during feeding, Bursts of sucking, swallowing, and rest, Upper lip turned in, Lower lip turned in, Clenched jaws, Nipple - slurping/pulls/nibbles    LATCH TOOL  Latch: Grasps breast, tongue down, lips flanged, rhythmic sucking  Audible Swallowing: Spontaneous and intermittent (24 hours old)  Type of Nipple: Everted (After stimulation)  Comfort (Breast/Nipple): Filling, red/small blisters/bruises, mild/moderate discomfort  Hold (Positioning): Minimal assist, teach one side, mother does other, staff holds  LATCH Score: 8    Breast Pump       Other OB Lactation Tools  Lactation Tools: Lanolin, Comfort gels    Patient Follow-up  Inpatient Lactation Follow-up Needed : No  Outpatient Lactation Follow-up: Scheduled (REf to  [facility] out pt LC)  Lactation Professional - OK to Discharge: Yes    Other OB Lactation Documentation  Maternal Risk  "Factors: Age over 30, primiparity, Other (comment) (DLBCL - chemo in past, ADULT ADHD,Anxiety,Pulmonary SANDRO, MAC,)  Infant Risk Factors: Infant Apgar <8, Other (comment) ([id number])    Recommendations/Summary  RN IBCLC in room to offer consult.  Mother/\"Lori\" is 36yo   on 25 at 1553 for viable girl \"Kacey\" at 40.2 weeks gestation. Please see chart for mothers history. NB BW 3500 APGAR 7&9 TCB 2.4@13HOL. Mother  her 4yo daughter for 6months at least. Mother goal is to EBF at this time.  Mother has NB latched in football hold on right side. NB is latched narrow and breast tissue pulling. LC asked if latch is painful or pinchy mother endorses pain. Suction broken and NB off breast, Nipple is compressed underneath and very red. LC worked with mother to latch more deeply and give NB chin support. Taught ABC's of good positioning: arms around breast, infant belly to belly with parent, infant's curve of hip to parent's curve of body. Taught to have infant rest their chin on the breast below the areola. Parents instructed to wait for gape reflex elicited by chin pressure on the breast, before hugging infant close to facilitate latching. Parents require assistance from IBCLC to time latching. Infant able to latch - re-latched self and continues to be narrow and sustained baby led sucking. LC attempted to re latch x3 however NB continues to slurp on an off breast. Mother does endorse latching feels better with chin support. Mother would like out pt follow up. Educated parents on skin to skin, hand expression, hunger cues and feeding frequency/patterns. Discussed expected output, weight loss <10%, and normal bilirubin. Educated on AAP pacifier recommendations. Reviewed outpatient resources. Cue based feeding, at least 8-12 times in 24 hours  Frequent skin to skin  Hand express for extra volume  Call for assistance as needed   "

## 2025-04-29 ENCOUNTER — LACTATION CONSULT (OUTPATIENT)
Dept: LACTATION | Facility: CLINIC | Age: 35
End: 2025-04-29
Payer: COMMERCIAL

## 2025-04-29 DIAGNOSIS — O92.70 DISORDER OF LACTATION (HHS-HCC): Primary | ICD-10-CM

## 2025-04-29 PROCEDURE — 99211 OFF/OP EST MAY X REQ PHY/QHP: CPT | Performed by: OBSTETRICS & GYNECOLOGY

## 2025-04-29 ASSESSMENT — COLUMBIA-SUICIDE SEVERITY RATING SCALE - C-SSRS
2. HAVE YOU ACTUALLY HAD ANY THOUGHTS OF KILLING YOURSELF?: NO
6. HAVE YOU EVER DONE ANYTHING, STARTED TO DO ANYTHING, OR PREPARED TO DO ANYTHING TO END YOUR LIFE?: NO
1. IN THE PAST MONTH, HAVE YOU WISHED YOU WERE DEAD OR WISHED YOU COULD GO TO SLEEP AND NOT WAKE UP?: NO

## 2025-04-29 NOTE — PROGRESS NOTES
Lactation Counseling Note    Subjective:    Lori Fry is a 34 y.o. patient referred for lactation counseling. She is here today due to Latch issues. She was referred by her OB/GYN Raghavendra Hughes     OB HISTORY:   Baby Name: Kacey  /Para: : 2  Para: 2 40.3 weeks gestation  Mode of Delivery: Normal vaginal route  Induction/Augmentation  Epidural/General Anesthesia  Delivery Complications: None  Maternal Complications: None   Information: : 25  [facility]: [facility] Provider: Laxmi  APGARS: Unknown by parent here today.  Skin to skin contact: First hour  Birth weight: 7 lb 11 oz  Birth length: 19.5 inches    Discharge weight: 7 lb 4 oz  Complications: painful latch    Objective:    BREASTFEEDING ASSESSMENT:   Physical Exam    Breast Assessment: Medium, Filling, and Soft  Nipple Assessment/Stage: intact, erect, erect with stimulation, and rounded after feeding none  Areola: Normal  Feeding Position: breast sandwich, c - hold, cross - cradle, and mother demonstrates good positioning  Latch: moderate assistance is needed, eagerly grasped on to latch, areolar attachment, deep latch obtained, bursts of sucking, swallowing, and rest, and comfortable latch  Suck/Feeding: sustained  Infant Feeding Method: Breast milk only  Infant Behavior: awake, crying, readiness to feed, feeding cues observed, and content after feeding  Infant Information: Good cupping of tongue  Good lateral movement of the tongue  Able to elevate tongue to roof of mouth  Breast Pain: Pain scale 0-10 (10 most pain): 0  Nipple Pain: Pain scale 0-10 (10 most pain): 0  Weight: Reported pediatrician visit weight: 7 lb 9 oz  Date of pediatrician weight: 25  Weight before feedin gm  Weight after feedin gm  Amount of breast milk transferred: 70ml ml  Number of voids in the last 24 hours: 6  Number of stools in the last 24 hours: 6    PATIENT DISCUSSION SUMMARY:    LACTATION PROBLEMS AND  STRATEGIES:  Problems latching baby to breast: Baby should latch to areola (dark area) not just the nipple.  Baby's lips should be flanged outward.  Bring the baby up to the level of your breast by putting a pillow under the baby.  Do not use bottles or pacifiers until latching on well.  Dribble milk over the nipple or express milk so that baby can taste it  Encourage a deeper latch with the asymetrical latch- lining baby's nose opposite mother's nipple.  Encourage nipple to stand up prior to feeing by pumping, nipple rolling or by brief application of ice.  Gently tickle your baby's lip with your nipple to encourage your baby to open his/her mouth wide.  Hold baby so that your breast is positioned deep in the baby's mouth.  Hold your baby close, tummy to tummy.  If baby does not latch to breast, express breast milk.  If engorged, express some milk to soften breast.  If the baby is not latched on well, break seal and gently try again.  Keep baby skin to skin and watch for feeding cues.  Massage breast to start milk-ejection reflex.  Place nipple and at least 1 inch of areola into baby's mouth.  Place your hand behind the baby's neck and shoulder.  Do not force baby's head into breast.  Put baby in the football hold or clutch hold, supporting his/her neck and head in sniffing position to open his/her throat.  Shape breast into oval shape (sandwich hold)  for deep latch.  Try different feeding positions (cradle, football, side etc.).  Use a breast feeding pillow to bring baby up to the level of the breast.  Use nipple shield and monitor baby's weight gain and output.  Use semi-reclined feeding position to allow baby to take an active role and trigger baby's inborn feeding behavior.  Use your hand to support your breast during feeding.  When your baby's mouth is wide open, quickly pull baby into your breast.  Your baby's chin should be pressed into your breast.  PATIENT INSTRUCTION HANDOUTS GIVEN:   Healthy Mother and  Baby Program  Foods that promote good milk production  Breastfeeding: Tips to increase your milk supply (PI# [id number])  LACTATION EDUCATION:  Waking Techniques, Correct Positioning, and Correct Latch On

## 2025-04-30 ENCOUNTER — TELEPHONE (OUTPATIENT)
Dept: LACTATION | Facility: CLINIC | Age: 35
End: 2025-04-30
Payer: COMMERCIAL

## 2025-06-03 ENCOUNTER — APPOINTMENT (OUTPATIENT)
Dept: OBSTETRICS AND GYNECOLOGY | Facility: CLINIC | Age: 35
End: 2025-06-03
Payer: COMMERCIAL

## 2025-06-03 VITALS
OXYGEN SATURATION: 97 % | DIASTOLIC BLOOD PRESSURE: 65 MMHG | HEIGHT: 62 IN | SYSTOLIC BLOOD PRESSURE: 99 MMHG | BODY MASS INDEX: 34.23 KG/M2 | WEIGHT: 186 LBS | HEART RATE: 72 BPM

## 2025-06-03 DIAGNOSIS — N39.3 STRESS INCONTINENCE: ICD-10-CM

## 2025-06-03 DIAGNOSIS — Z30.09 BIRTH CONTROL COUNSELING: Primary | ICD-10-CM

## 2025-06-03 DIAGNOSIS — N39.3 STRESS INCONTINENCE (FEMALE) (MALE): ICD-10-CM

## 2025-06-03 PROBLEM — Z3A.40 40 WEEKS GESTATION OF PREGNANCY (HHS-HCC): Status: RESOLVED | Noted: 2025-04-21 | Resolved: 2025-06-03

## 2025-06-03 PROBLEM — Z3A.39 39 WEEKS GESTATION OF PREGNANCY (HHS-HCC): Status: RESOLVED | Noted: 2024-09-13 | Resolved: 2025-06-03

## 2025-06-03 PROCEDURE — 0503F POSTPARTUM CARE VISIT: CPT | Performed by: OBSTETRICS & GYNECOLOGY

## 2025-06-03 PROCEDURE — 87626 HPV SEP HI-RSK TYP&POOL RSLT: CPT

## 2025-06-03 RX ORDER — NORETHINDRONE 0.35 MG/1
1 TABLET ORAL DAILY
Qty: 90 TABLET | Refills: 3 | Status: SHIPPED | OUTPATIENT
Start: 2025-06-03 | End: 2026-06-03

## 2025-06-03 RX ORDER — ESCITALOPRAM OXALATE 10 MG/1
TABLET ORAL
COMMUNITY
Start: 2025-05-31

## 2025-06-03 ASSESSMENT — EDINBURGH POSTNATAL DEPRESSION SCALE (EPDS)
I HAVE BEEN SO UNHAPPY THAT I HAVE HAD DIFFICULTY SLEEPING: NOT AT ALL
THINGS HAVE BEEN GETTING ON TOP OF ME: NO, MOST OF THE TIME I HAVE COPED QUITE WELL
I HAVE BEEN SO UNHAPPY THAT I HAVE BEEN CRYING: NO, NEVER
I HAVE LOOKED FORWARD WITH ENJOYMENT TO THINGS: AS MUCH AS I EVER DID
I HAVE BLAMED MYSELF UNNECESSARILY WHEN THINGS WENT WRONG: YES, SOME OF THE TIME
I HAVE BEEN ANXIOUS OR WORRIED FOR NO GOOD REASON: YES, SOMETIMES
I HAVE FELT SAD OR MISERABLE: NOT VERY OFTEN
I HAVE BEEN ABLE TO LAUGH AND SEE THE FUNNY SIDE OF THINGS: NOT QUITE SO MUCH NOW
I HAVE FELT SCARED OR PANICKY FOR NO GOOD REASON: YES, SOMETIMES
THE THOUGHT OF HARMING MYSELF HAS OCCURRED TO ME: NEVER
TOTAL SCORE: 9

## 2025-06-03 NOTE — PROGRESS NOTES
"Patient presents for a 6 week postpartum visit   Delivered vaginally on 2025  Last PAP 2020 NEG HPV-     Elisabeth Fontana, RMA    Postpartum Visit    HPI:  Pt presents for her 6 week postpartum visit s/p  on 25 without complications.  She had a baby girl.  She is Breast feeding. Feeling well overall, mood is improving on lexapro and counseling.  Due for pap.  Desires norethindrone for birth control.     ROS:  Denies the following: Complains of the following:    - episiotomy site pain   - incisional pain  - incisional drainage  - heavy bleeding  - irregular bleeding  - breast pain  - cracked nipples  - breastfeeding problems  - abdominal pain  - vaginal discharge  - shortness of breath  - chest pain  - back pain  - leg pain  - depression  - suicidal ideation  - nausea  - vomiting  - fever  - pain with urination  - tiredness or fatigue  - headache -vaginal discharge  -fatigue       O:  BP 99/65 (BP Location: Left arm, Patient Position: Sitting, BP Cuff Size: Adult)   Pulse 72   Ht 1.575 m (5' 2\")   Wt 84.4 kg (186 lb)   LMP 2024   SpO2 97%   Breastfeeding Yes   BMI 34.02 kg/m²     General Appearance   - consistent with stated age, well groomed and cooperative    Integumentary  - skin warm and dry without rash    Head and Neck  - normalocephalic and neck supple    Female Genitourinary  - vulva normal without rash or lesion, normal vaginal rugae, no vaginal discharge, uterus normal size & cervix normal appearing    Peripheral Vascular  - no edema present    A/P:   Pt is a 34 y.o.  who presents for 6 week postpartum visit.  Doing well overall postpartum.  Coping well with new baby at home.  [place]  Depression Scale Total: 9. Mood improving with lexapro and counseling.  Breastfeeding going well.  . Baby doing well.  Contraception discussed - wants to use progesterone only pills. Pap done today.  Will send to PT for JUNIOR.  f/u for annual exam or as needed.    Beba Mabry, " MD

## 2025-06-17 LAB
CYTOLOGY CMNT CVX/VAG CYTO-IMP: NORMAL
HPV HR 12 DNA GENITAL QL NAA+PROBE: NEGATIVE
HPV HR GENOTYPES PNL CVX NAA+PROBE: NEGATIVE
HPV16 DNA SPEC QL NAA+PROBE: NEGATIVE
HPV18 DNA SPEC QL NAA+PROBE: NEGATIVE
LAB AP HPV GENOTYPE QUESTION: YES
LAB AP HPV HR: NORMAL
LABORATORY COMMENT REPORT: NORMAL
PATH REPORT.TOTAL CANCER: NORMAL

## 2025-06-23 ENCOUNTER — APPOINTMENT (OUTPATIENT)
Dept: DERMATOLOGY | Facility: CLINIC | Age: 35
End: 2025-06-23
Payer: COMMERCIAL

## 2025-06-27 ENCOUNTER — OFFICE VISIT (OUTPATIENT)
Dept: DERMATOLOGY | Facility: CLINIC | Age: 35
End: 2025-06-27
Payer: COMMERCIAL

## 2025-06-27 DIAGNOSIS — D48.5 NEOPLASM OF UNCERTAIN BEHAVIOR OF SKIN: Primary | ICD-10-CM

## 2025-06-27 DIAGNOSIS — D22.5 MELANOCYTIC NEVI OF TRUNK: ICD-10-CM

## 2025-06-27 DIAGNOSIS — L82.1 SEBORRHEIC KERATOSIS: ICD-10-CM

## 2025-06-27 PROCEDURE — 1036F TOBACCO NON-USER: CPT | Performed by: DERMATOLOGY

## 2025-06-27 PROCEDURE — 11306 SHAVE SKIN LESION 0.6-1.0 CM: CPT | Performed by: DERMATOLOGY

## 2025-06-27 PROCEDURE — 99202 OFFICE O/P NEW SF 15 MIN: CPT | Performed by: DERMATOLOGY

## 2025-06-27 NOTE — PROGRESS NOTES
Subjective     Lori Fry is a 34 y.o. female who presents for the following: Suspicious Skin Lesion (New - Area(s) of concern: mons pubis area (x2) that has been present for apprx 1 yr, growing in size, no symptoms, no topicals, no past treatments).   Patient is breastfeeding.       Review of Systems:  No other skin or systemic complaints other than what is documented elsewhere in the note.    The following portions of the chart were reviewed this encounter and updated as appropriate:          Skin Cancer History  Biopsy Log Book  No skin cancers from Specimen Tracking.    Additional History      Specialty Problems    None       Objective   Well appearing patient in no apparent distress; mood and affect are within normal limits.    A focused skin examination was performed of the pubic area. All findings within normal limits unless otherwise noted below.    Assessment/Plan   Skin Exam  1. NEOPLASM OF UNCERTAIN BEHAVIOR OF SKIN  Pubic  1cm brown stuck on appearing plaque    Shave removal    Lesion length (cm):  1  Lesion width (cm):  1  Margin per side (cm):  0  Lesion diameter (cm):  1  Informed consent: discussed and consent obtained    Timeout: patient name, date of birth, surgical site, and procedure verified    Procedure prep:  Patient was prepped and draped  Anesthesia: the lesion was anesthetized in a standard fashion    Anesthetic:  1% lidocaine plain local infiltration  Instrument used: DermaBlade    Hemostasis achieved with: aluminum chloride    Outcome: patient tolerated procedure well    Post-procedure details: sterile dressing applied and wound care instructions given    Dressing type: bandage and petrolatum      Staff Communication: Dermatology Local Anesthesia: 1 % Plain Lidocaine - Amount:1cc  Specimen 1 - Dermatopathology- DERM LAB  Differential Diagnosis: sk vs. nevus  Check Margins Yes/No?:    Comments:    Dermpath Lab: Routine Histopathology (formalin-fixed tissue)  Due to symptomatic nature  of this skin lesion offered shave removal.  The lesion will be traded for a scar and sent for pathology.  The risks include incomplete removal, repigmentation of the lesion after removal.  2. SEBORRHEIC KERATOSIS  Left Suprapubic Area  Brown, tan waxy and stuck on appearing papule  The benign nature of these skin lesions reviewed, reassure provided and no further treatment needed at this time.   These lesions can be removed, if symptomatic (itching, bleeding, rubbing on clothing, painful), otherwise removal is considered cosmetic.     LN2 as courtesy x 1 lesion. The risks and benefits of LN2 were reviewed including incomplete removal, crusting, blister hypo and/or hyperpigmentation, scarring and recurrence.    Destr of lesion - Left Suprapubic Area  Complexity: simple    Destruction method: cryotherapy    Informed consent: discussed and consent obtained    Lesion destroyed using liquid nitrogen: Yes    Cryotherapy cycles:  2  Outcome: patient tolerated procedure well with no complications    Post-procedure details: wound care instructions given      I did recommend scheduling a FBSE at her earliest convenience for baseline skin examination as she has not previously seen a dermatologist.

## 2025-06-27 NOTE — Clinical Note
Due to symptomatic nature of this skin lesion offered shave removal.  The lesion will be traded for a scar and sent for pathology.  The risks include incomplete removal, repigmentation of the lesion after removal.

## 2025-06-27 NOTE — Clinical Note
The benign nature of these skin lesions reviewed, reassure provided and no further treatment needed at this time.   These lesions can be removed, if symptomatic (itching, bleeding, rubbing on clothing, painful), otherwise removal is considered cosmetic.     LN2 as courtesy x 1 lesion. The risks and benefits of LN2 were reviewed including incomplete removal, crusting, blister hypo and/or hyperpigmentation, scarring and recurrence.

## 2025-07-01 LAB
LABORATORY COMMENT REPORT: NORMAL
PATH REPORT.FINAL DX SPEC: NORMAL
PATH REPORT.GROSS SPEC: NORMAL
PATH REPORT.MICROSCOPIC SPEC OTHER STN: NORMAL
PATH REPORT.RELEVANT HX SPEC: NORMAL
PATH REPORT.TOTAL CANCER: NORMAL

## 2025-08-08 ENCOUNTER — EVALUATION (OUTPATIENT)
Dept: PHYSICAL THERAPY | Facility: CLINIC | Age: 35
End: 2025-08-08
Payer: COMMERCIAL

## 2025-08-08 DIAGNOSIS — N39.3 STRESS INCONTINENCE: ICD-10-CM

## 2025-08-08 DIAGNOSIS — M62.81 MUSCLE WEAKNESS: Primary | ICD-10-CM

## 2025-08-08 PROCEDURE — 97110 THERAPEUTIC EXERCISES: CPT | Mod: GP

## 2025-08-08 PROCEDURE — 97161 PT EVAL LOW COMPLEX 20 MIN: CPT | Mod: GP

## 2025-08-12 ENCOUNTER — APPOINTMENT (OUTPATIENT)
Dept: DERMATOLOGY | Facility: CLINIC | Age: 35
End: 2025-08-12
Payer: COMMERCIAL

## 2025-08-12 DIAGNOSIS — D22.71 MELANOCYTIC NEVI OF RIGHT LOWER LIMB, INCLUDING HIP: ICD-10-CM

## 2025-08-12 DIAGNOSIS — D22.60 MELANOCYTIC NEVI OF UNSPECIFIED UPPER LIMB, INCLUDING SHOULDER: ICD-10-CM

## 2025-08-12 DIAGNOSIS — D18.01 HEMANGIOMA OF SKIN: ICD-10-CM

## 2025-08-12 DIAGNOSIS — D22.5 MELANOCYTIC NEVI OF TRUNK: Primary | ICD-10-CM

## 2025-08-12 DIAGNOSIS — L57.8 PHOTOAGING OF SKIN: ICD-10-CM

## 2025-08-12 DIAGNOSIS — D22.72 MELANOCYTIC NEVUS OF LEFT LOWER EXTREMITY: ICD-10-CM

## 2025-08-12 DIAGNOSIS — Z12.83 ENCOUNTER FOR SCREENING FOR MALIGNANT NEOPLASM OF SKIN: ICD-10-CM

## 2025-08-12 PROCEDURE — 99213 OFFICE O/P EST LOW 20 MIN: CPT | Performed by: DERMATOLOGY

## 2025-08-15 ENCOUNTER — TREATMENT (OUTPATIENT)
Dept: PHYSICAL THERAPY | Facility: CLINIC | Age: 35
End: 2025-08-15
Payer: COMMERCIAL

## 2025-08-15 DIAGNOSIS — N39.3 STRESS INCONTINENCE: ICD-10-CM

## 2025-08-15 DIAGNOSIS — M62.81 MUSCLE WEAKNESS: ICD-10-CM

## 2025-08-15 PROCEDURE — 97112 NEUROMUSCULAR REEDUCATION: CPT | Mod: GP

## 2025-08-15 PROCEDURE — 97110 THERAPEUTIC EXERCISES: CPT | Mod: GP

## 2025-08-22 ENCOUNTER — APPOINTMENT (OUTPATIENT)
Dept: PHYSICAL THERAPY | Facility: CLINIC | Age: 35
End: 2025-08-22
Payer: COMMERCIAL

## 2025-08-29 ENCOUNTER — TREATMENT (OUTPATIENT)
Dept: PHYSICAL THERAPY | Facility: CLINIC | Age: 35
End: 2025-08-29
Payer: COMMERCIAL

## 2025-08-29 DIAGNOSIS — N39.3 STRESS INCONTINENCE: ICD-10-CM

## 2025-08-29 DIAGNOSIS — M62.81 MUSCLE WEAKNESS: ICD-10-CM

## 2025-08-29 PROCEDURE — 97110 THERAPEUTIC EXERCISES: CPT | Mod: GP

## 2025-10-22 ENCOUNTER — APPOINTMENT (OUTPATIENT)
Facility: CLINIC | Age: 35
End: 2025-10-22
Payer: COMMERCIAL

## 2026-08-14 ENCOUNTER — APPOINTMENT (OUTPATIENT)
Dept: DERMATOLOGY | Facility: CLINIC | Age: 36
End: 2026-08-14
Payer: COMMERCIAL